# Patient Record
Sex: MALE | Race: WHITE | NOT HISPANIC OR LATINO | URBAN - METROPOLITAN AREA
[De-identification: names, ages, dates, MRNs, and addresses within clinical notes are randomized per-mention and may not be internally consistent; named-entity substitution may affect disease eponyms.]

---

## 2021-01-25 ENCOUNTER — EMERGENCY (EMERGENCY)
Facility: HOSPITAL | Age: 31
LOS: 0 days | Discharge: HOME | End: 2021-01-25
Attending: EMERGENCY MEDICINE | Admitting: EMERGENCY MEDICINE
Payer: MEDICAID

## 2021-01-25 VITALS
HEART RATE: 66 BPM | SYSTOLIC BLOOD PRESSURE: 125 MMHG | TEMPERATURE: 98 F | OXYGEN SATURATION: 98 % | DIASTOLIC BLOOD PRESSURE: 77 MMHG | RESPIRATION RATE: 19 BRPM | WEIGHT: 190.04 LBS

## 2021-01-25 VITALS
SYSTOLIC BLOOD PRESSURE: 132 MMHG | RESPIRATION RATE: 18 BRPM | OXYGEN SATURATION: 98 % | DIASTOLIC BLOOD PRESSURE: 71 MMHG | HEART RATE: 67 BPM | TEMPERATURE: 98 F

## 2021-01-25 DIAGNOSIS — K92.1 MELENA: ICD-10-CM

## 2021-01-25 DIAGNOSIS — K62.89 OTHER SPECIFIED DISEASES OF ANUS AND RECTUM: ICD-10-CM

## 2021-01-25 LAB
ALBUMIN SERPL ELPH-MCNC: 4.4 G/DL — SIGNIFICANT CHANGE UP (ref 3.5–5.2)
ALP SERPL-CCNC: 68 U/L — SIGNIFICANT CHANGE UP (ref 30–115)
ALT FLD-CCNC: 23 U/L — SIGNIFICANT CHANGE UP (ref 0–41)
ANION GAP SERPL CALC-SCNC: 8 MMOL/L — SIGNIFICANT CHANGE UP (ref 7–14)
AST SERPL-CCNC: 21 U/L — SIGNIFICANT CHANGE UP (ref 0–41)
BASOPHILS # BLD AUTO: 0.03 K/UL — SIGNIFICANT CHANGE UP (ref 0–0.2)
BASOPHILS NFR BLD AUTO: 0.6 % — SIGNIFICANT CHANGE UP (ref 0–1)
BILIRUB SERPL-MCNC: 0.4 MG/DL — SIGNIFICANT CHANGE UP (ref 0.2–1.2)
BUN SERPL-MCNC: 11 MG/DL — SIGNIFICANT CHANGE UP (ref 10–20)
CALCIUM SERPL-MCNC: 9.3 MG/DL — SIGNIFICANT CHANGE UP (ref 8.5–10.1)
CHLORIDE SERPL-SCNC: 101 MMOL/L — SIGNIFICANT CHANGE UP (ref 98–110)
CO2 SERPL-SCNC: 30 MMOL/L — SIGNIFICANT CHANGE UP (ref 17–32)
CREAT SERPL-MCNC: 1.1 MG/DL — SIGNIFICANT CHANGE UP (ref 0.7–1.5)
EOSINOPHIL # BLD AUTO: 0.02 K/UL — SIGNIFICANT CHANGE UP (ref 0–0.7)
EOSINOPHIL NFR BLD AUTO: 0.4 % — SIGNIFICANT CHANGE UP (ref 0–8)
GLUCOSE SERPL-MCNC: 97 MG/DL — SIGNIFICANT CHANGE UP (ref 70–99)
HCT VFR BLD CALC: 43.8 % — SIGNIFICANT CHANGE UP (ref 42–52)
HGB BLD-MCNC: 14.6 G/DL — SIGNIFICANT CHANGE UP (ref 14–18)
IMM GRANULOCYTES NFR BLD AUTO: 0.2 % — SIGNIFICANT CHANGE UP (ref 0.1–0.3)
LIDOCAIN IGE QN: 47 U/L — SIGNIFICANT CHANGE UP (ref 7–60)
LYMPHOCYTES # BLD AUTO: 1.14 K/UL — LOW (ref 1.2–3.4)
LYMPHOCYTES # BLD AUTO: 22.6 % — SIGNIFICANT CHANGE UP (ref 20.5–51.1)
MCHC RBC-ENTMCNC: 27.4 PG — SIGNIFICANT CHANGE UP (ref 27–31)
MCHC RBC-ENTMCNC: 33.3 G/DL — SIGNIFICANT CHANGE UP (ref 32–37)
MCV RBC AUTO: 82.2 FL — SIGNIFICANT CHANGE UP (ref 80–94)
MONOCYTES # BLD AUTO: 0.42 K/UL — SIGNIFICANT CHANGE UP (ref 0.1–0.6)
MONOCYTES NFR BLD AUTO: 8.3 % — SIGNIFICANT CHANGE UP (ref 1.7–9.3)
NEUTROPHILS # BLD AUTO: 3.42 K/UL — SIGNIFICANT CHANGE UP (ref 1.4–6.5)
NEUTROPHILS NFR BLD AUTO: 67.9 % — SIGNIFICANT CHANGE UP (ref 42.2–75.2)
NRBC # BLD: 0 /100 WBCS — SIGNIFICANT CHANGE UP (ref 0–0)
PLATELET # BLD AUTO: 225 K/UL — SIGNIFICANT CHANGE UP (ref 130–400)
POTASSIUM SERPL-MCNC: 4.2 MMOL/L — SIGNIFICANT CHANGE UP (ref 3.5–5)
POTASSIUM SERPL-SCNC: 4.2 MMOL/L — SIGNIFICANT CHANGE UP (ref 3.5–5)
PROT SERPL-MCNC: 7 G/DL — SIGNIFICANT CHANGE UP (ref 6–8)
RBC # BLD: 5.33 M/UL — SIGNIFICANT CHANGE UP (ref 4.7–6.1)
RBC # FLD: 12.1 % — SIGNIFICANT CHANGE UP (ref 11.5–14.5)
SODIUM SERPL-SCNC: 139 MMOL/L — SIGNIFICANT CHANGE UP (ref 135–146)
WBC # BLD: 5.04 K/UL — SIGNIFICANT CHANGE UP (ref 4.8–10.8)
WBC # FLD AUTO: 5.04 K/UL — SIGNIFICANT CHANGE UP (ref 4.8–10.8)

## 2021-01-25 PROCEDURE — 99285 EMERGENCY DEPT VISIT HI MDM: CPT

## 2021-01-25 PROCEDURE — 74177 CT ABD & PELVIS W/CONTRAST: CPT | Mod: 26

## 2021-01-25 RX ORDER — IOHEXOL 300 MG/ML
30 INJECTION, SOLUTION INTRAVENOUS ONCE
Refills: 0 | Status: COMPLETED | OUTPATIENT
Start: 2021-01-25 | End: 2021-01-25

## 2021-01-25 RX ORDER — SODIUM CHLORIDE 9 MG/ML
1000 INJECTION INTRAMUSCULAR; INTRAVENOUS; SUBCUTANEOUS ONCE
Refills: 0 | Status: COMPLETED | OUTPATIENT
Start: 2021-01-25 | End: 2021-01-25

## 2021-01-25 RX ADMIN — SODIUM CHLORIDE 1000 MILLILITER(S): 9 INJECTION INTRAMUSCULAR; INTRAVENOUS; SUBCUTANEOUS at 13:48

## 2021-01-25 RX ADMIN — IOHEXOL 30 MILLILITER(S): 300 INJECTION, SOLUTION INTRAVENOUS at 13:48

## 2021-01-25 NOTE — ED PROVIDER NOTE - PHYSICAL EXAMINATION
--EXAM--  VITAL SIGNS: I have reviewed vs documented at present.  CONSTITUTIONAL: Well-developed; well-nourished; in no acute distress.   SKIN: Warm and dry, no acute rash.   HEAD: Normocephalic; atraumatic.  EYES: PERRL, EOM intact; conjunctiva and sclera clear. No nystagmus.  ENT: No nasal discharge; airway clear.  NECK: Supple; non tender.  CARD: S1, S2, Regular rate and rhythm.   RESP: No wheezes, rales or rhonchi.  ABD: Normal bowel sounds; soft; non-distended; non-tender.  rectal exam brown stool there is red blood noted   EXT: Normal ROM.   NEURO: Alert, oriented, grossly unremarkable. Strength 5/5 in all extremities. Sensation intact throughout.  PSYCH: Cooperative, appropriate.

## 2021-01-25 NOTE — ED ADULT NURSE NOTE - NSIMPLEMENTINTERV_GEN_ALL_ED
Implemented All Universal Safety Interventions:  [place] to call system. Call bell, personal items and telephone within reach. Instruct patient to call for assistance. Room bathroom lighting operational. Non-slip footwear when patient is off stretcher. Physically safe environment: no spills, clutter or unnecessary equipment. Stretcher in lowest position, wheels locked, appropriate side rails in place.

## 2021-01-25 NOTE — ED PROVIDER NOTE - CARE PROVIDER_API CALL
India Arteaga (MD)  Gastroenterology  [address] [place] [address]  Phone: [phone]  Fax: [phone]  Follow Up Time:

## 2021-01-25 NOTE — ED PROVIDER NOTE - PATIENT PORTAL LINK FT
You can access the FollowMyHealth Patient Portal offered by [facility] by registering at the following website: [email or web address]. By joining [facility]’s FollowMyHealth portal, you will also be able to view your health information using other applications (apps) compatible with our system.

## 2021-01-25 NOTE — ED PROVIDER NOTE - CLINICAL SUMMARY MEDICAL DECISION MAKING FREE TEXT BOX
Results reviewed and d/w pt.  H/H good.  Ct findings noted.  In my opinion outpatient work up is appropriate.   Importance og GI follow up for direct visualization stressed.  Pt instructed to return if any worsening symptoms or concerns.  They verbalize understanding.

## 2021-01-25 NOTE — ED PROVIDER NOTE - ATTENDING CONTRIBUTION TO CARE
32 yo M presents with c/o rectal bleeding with BM x 1 year, no abd pain, no weight loss.  no n/v.  appetite is nml. no Fhx.  On exam pt in NAD AAO x 3l seen ambulate with steady gait, abd soft nt nd, + blood on rectal exam, no hemorrhoid,

## 2021-01-25 NOTE — ED PROVIDER NOTE - NS ED ROS FT
Review of Systems:  	•	CONSTITUTIONAL - no fever, no diaphoresis, no chills  	•	SKIN - no rash  	•	HEMATOLOGIC - no bleeding, no bruising  	•	EYES - no eye pain, no blurry vision  	•	ENT - no change in hearing, no sore throat, no ear pain or tinnitus  	•	RESPIRATORY - no shortness of breath, no cough  	•	CARDIAC - no chest pain, no palpitations  	•	GI - no abd pain, no nausea, no vomiting, no diarrhea,  positive constipation bleeding stool   	•	GENITO-URINARY - no discharge, no dysuria; no hematuria, no increased urinary frequency  	•	MUSCULOSKELETAL - no joint paint, no swelling, no redness  	•	NEUROLOGIC - no weakness, no headache, no paresthesias, no LOC  	•	PSYCH - no anxiety, non suicidal, non homicidal, no hallucination, no depression

## 2021-01-25 NOTE — ED PROVIDER NOTE - OBJECTIVE STATEMENT
this is a 30 yo male presents to ed for evaluation of bloody stool. patient states this is going on one year. patient came today to get check .

## 2021-02-03 ENCOUNTER — INPATIENT (INPATIENT)
Facility: HOSPITAL | Age: 31
LOS: 3 days | Discharge: HOME | End: 2021-02-07
Attending: INTERNAL MEDICINE | Admitting: INTERNAL MEDICINE
Payer: MEDICAID

## 2021-02-03 VITALS
HEART RATE: 81 BPM | OXYGEN SATURATION: 99 % | RESPIRATION RATE: 20 BRPM | DIASTOLIC BLOOD PRESSURE: 72 MMHG | SYSTOLIC BLOOD PRESSURE: 118 MMHG | TEMPERATURE: 98 F

## 2021-02-03 DIAGNOSIS — Z29.9 ENCOUNTER FOR PROPHYLACTIC MEASURES, UNSPECIFIED: ICD-10-CM

## 2021-02-03 DIAGNOSIS — K92.2 GASTROINTESTINAL HEMORRHAGE, UNSPECIFIED: ICD-10-CM

## 2021-02-03 DIAGNOSIS — Z87.738 PERSONAL HISTORY OF OTHER SPECIFIED (CORRECTED) CONGENITAL MALFORMATIONS OF DIGESTIVE SYSTEM: Chronic | ICD-10-CM

## 2021-02-03 DIAGNOSIS — K62.3 RECTAL PROLAPSE: ICD-10-CM

## 2021-02-03 DIAGNOSIS — K63.3 ULCER OF INTESTINE: ICD-10-CM

## 2021-02-03 LAB
ALBUMIN SERPL ELPH-MCNC: 4.4 G/DL — SIGNIFICANT CHANGE UP (ref 3.5–5.2)
ALP SERPL-CCNC: 68 U/L — SIGNIFICANT CHANGE UP (ref 30–115)
ALT FLD-CCNC: 22 U/L — SIGNIFICANT CHANGE UP (ref 0–41)
ANION GAP SERPL CALC-SCNC: 9 MMOL/L — SIGNIFICANT CHANGE UP (ref 7–14)
APTT BLD: 32.5 SEC — SIGNIFICANT CHANGE UP (ref 27–39.2)
AST SERPL-CCNC: 20 U/L — SIGNIFICANT CHANGE UP (ref 0–41)
BASOPHILS # BLD AUTO: 0.02 K/UL — SIGNIFICANT CHANGE UP (ref 0–0.2)
BASOPHILS NFR BLD AUTO: 0.5 % — SIGNIFICANT CHANGE UP (ref 0–1)
BILIRUB SERPL-MCNC: 0.3 MG/DL — SIGNIFICANT CHANGE UP (ref 0.2–1.2)
BLD GP AB SCN SERPL QL: SIGNIFICANT CHANGE UP
BUN SERPL-MCNC: 9 MG/DL — LOW (ref 10–20)
CALCIUM SERPL-MCNC: 9 MG/DL — SIGNIFICANT CHANGE UP (ref 8.5–10.1)
CHLORIDE SERPL-SCNC: 102 MMOL/L — SIGNIFICANT CHANGE UP (ref 98–110)
CO2 SERPL-SCNC: 28 MMOL/L — SIGNIFICANT CHANGE UP (ref 17–32)
CREAT SERPL-MCNC: 0.9 MG/DL — SIGNIFICANT CHANGE UP (ref 0.7–1.5)
EOSINOPHIL # BLD AUTO: 0.04 K/UL — SIGNIFICANT CHANGE UP (ref 0–0.7)
EOSINOPHIL NFR BLD AUTO: 1 % — SIGNIFICANT CHANGE UP (ref 0–8)
GLUCOSE SERPL-MCNC: 98 MG/DL — SIGNIFICANT CHANGE UP (ref 70–99)
HCT VFR BLD CALC: 41.8 % — LOW (ref 42–52)
HGB BLD-MCNC: 14.2 G/DL — SIGNIFICANT CHANGE UP (ref 14–18)
IMM GRANULOCYTES NFR BLD AUTO: 0.7 % — HIGH (ref 0.1–0.3)
INR BLD: 1.09 RATIO — SIGNIFICANT CHANGE UP (ref 0.65–1.3)
LYMPHOCYTES # BLD AUTO: 1.08 K/UL — LOW (ref 1.2–3.4)
LYMPHOCYTES # BLD AUTO: 26.3 % — SIGNIFICANT CHANGE UP (ref 20.5–51.1)
MCHC RBC-ENTMCNC: 27.6 PG — SIGNIFICANT CHANGE UP (ref 27–31)
MCHC RBC-ENTMCNC: 34 G/DL — SIGNIFICANT CHANGE UP (ref 32–37)
MCV RBC AUTO: 81.3 FL — SIGNIFICANT CHANGE UP (ref 80–94)
MONOCYTES # BLD AUTO: 0.48 K/UL — SIGNIFICANT CHANGE UP (ref 0.1–0.6)
MONOCYTES NFR BLD AUTO: 11.7 % — HIGH (ref 1.7–9.3)
NEUTROPHILS # BLD AUTO: 2.45 K/UL — SIGNIFICANT CHANGE UP (ref 1.4–6.5)
NEUTROPHILS NFR BLD AUTO: 59.8 % — SIGNIFICANT CHANGE UP (ref 42.2–75.2)
NRBC # BLD: 0 /100 WBCS — SIGNIFICANT CHANGE UP (ref 0–0)
PLATELET # BLD AUTO: 218 K/UL — SIGNIFICANT CHANGE UP (ref 130–400)
POTASSIUM SERPL-MCNC: 4.7 MMOL/L — SIGNIFICANT CHANGE UP (ref 3.5–5)
POTASSIUM SERPL-SCNC: 4.7 MMOL/L — SIGNIFICANT CHANGE UP (ref 3.5–5)
PROT SERPL-MCNC: 6.8 G/DL — SIGNIFICANT CHANGE UP (ref 6–8)
PROTHROM AB SERPL-ACNC: 12.5 SEC — SIGNIFICANT CHANGE UP (ref 9.95–12.87)
RAPID RVP RESULT: SIGNIFICANT CHANGE UP
RBC # BLD: 5.14 M/UL — SIGNIFICANT CHANGE UP (ref 4.7–6.1)
RBC # FLD: 12.2 % — SIGNIFICANT CHANGE UP (ref 11.5–14.5)
SARS-COV-2 RNA SPEC QL NAA+PROBE: SIGNIFICANT CHANGE UP
SODIUM SERPL-SCNC: 139 MMOL/L — SIGNIFICANT CHANGE UP (ref 135–146)
WBC # BLD: 4.1 K/UL — LOW (ref 4.8–10.8)
WBC # FLD AUTO: 4.1 K/UL — LOW (ref 4.8–10.8)

## 2021-02-03 PROCEDURE — 99223 1ST HOSP IP/OBS HIGH 75: CPT

## 2021-02-03 PROCEDURE — 99285 EMERGENCY DEPT VISIT HI MDM: CPT

## 2021-02-03 PROCEDURE — 71046 X-RAY EXAM CHEST 2 VIEWS: CPT | Mod: 26

## 2021-02-03 RX ORDER — ACETAMINOPHEN 500 MG
650 TABLET ORAL EVERY 4 HOURS
Refills: 0 | Status: DISCONTINUED | OUTPATIENT
Start: 2021-02-03 | End: 2021-02-07

## 2021-02-03 RX ORDER — PANTOPRAZOLE SODIUM 20 MG/1
40 TABLET, DELAYED RELEASE ORAL
Refills: 0 | Status: DISCONTINUED | OUTPATIENT
Start: 2021-02-03 | End: 2021-02-07

## 2021-02-03 RX ADMIN — PANTOPRAZOLE SODIUM 40 MILLIGRAM(S): 20 TABLET, DELAYED RELEASE ORAL at 21:05

## 2021-02-03 NOTE — ED ADULT NURSE NOTE - NSSUHOSCREENINGYN_ED_ALL_ED
-- serum creatinine on admission 2.7  -- baseline serum creatinine 1.9-2.1. At baseline after IVF  -- likely prerenal from poor p.o. intake     Yes - the patient is able to be screened

## 2021-02-03 NOTE — H&P ADULT - PROBLEM SELECTOR PLAN 1
GI consulted and plan for colonoscopy on 2/5/21.  start clear tomorrow then npo p midnite tomorrow  dulcolax and go lytely prep  preprocedure--cxr and ekg

## 2021-02-03 NOTE — CONSULT NOTE ADULT - ASSESSMENT
31yMale pmh rectal bleeding and intermittent melenic stools for one year and a half presents s/p reducible rectal prolapse the past 3 days after straining. Patient reports table spoons to a half cup of blood each time.  +rectal bleeding +intermittent melenic stools. Patient reports intermittent rectal pain as well with straining    Problem 1-Wall thickening of the rectum without definite adjacent fatty stranding. Differential includes inflammatory etiology. Correlation with patient's symptoms and direct visualization suggested.  -patient with intermittent melenic stools and daily rectal bleeding  ddx- IBD, malignancy, PUD  Rec  -Regular diet today  -EGD/Colonoscopy Friday as patient ate regular meal today  Colonoscopy Prep  -NPO aftermidnight Thursday into Friday  -AM CBC, AM CMP, AM INR, PT, PTT Friday  -Go-lytely 4L Prior day before Colonoscopy Thursday  -Four tablets Dulcolax two at 2pm and two at 6pm Prior to Colonoscopy Thursday   -Clear Liquids Day, no red dye before Colonoscopy Thursday  -Maintain Hemodynamic Stability   -Monitor CBC  -CMP,Optimize Electrolytes  -PT,PTT,INR  -EKG, Chest-Xray   -Transfuse prn to hgb >8  -Two large bore IV lines  -Monitor Vital Signs  -Monitor Stool For blood, frequency, consistency, melena  -Active Type and Screen   31yMale pmh rectal bleeding and intermittent melenic stools for one year and a half presents s/p reducible rectal prolapse the past 3 days after straining. Patient reports table spoons to a half cup of blood each time.  +rectal bleeding +intermittent melenic stools. Patient reports intermittent rectal pain as well with straining    Problem 1-Wall thickening of the rectum without definite adjacent fatty stranding. Differential includes inflammatory etiology. Correlation with patient's symptoms and direct visualization suggested.  -patient with intermittent melenic stools and daily rectal bleeding  ddx- IBD, malignancy, PUD  Rec  -COVID-19 Swab  -Regular diet today  -EGD/Colonoscopy Friday as patient ate regular meal today  Colonoscopy Prep  -NPO after midnight Thursday into Friday  -AM CBC, AM CMP, AM INR, PT, PTT Friday  -Go-lytely 4L Prior day before Colonoscopy Thursday  -Four tablets Dulcolax two at 2pm and two at 6pm Prior to Colonoscopy Thursday   -Clear Liquids Day, no red dye before Colonoscopy Thursday  -Maintain Hemodynamic Stability   -Monitor CBC  -CMP,Optimize Electrolytes  -PT,PTT,INR  -EKG, Chest-Xray   -Transfuse prn to hgb >8  -Two large bore IV lines  -Monitor Vital Signs  -Monitor Stool For blood, frequency, consistency, melena  -Active Type and Screen    problem 2-Moderately distended urinary bladder, nonspecific.  Rec  - Care as per primary team

## 2021-02-03 NOTE — ED PROVIDER NOTE - OBJECTIVE STATEMENT
Patient c/o rectal bleeding for 1 year, Seen in ED 1 week ago, CT neg, States last night had pain with bleeding with rectal prolapse, No N/V, no fever. Patient has picture on his phone show large rectal prolapse

## 2021-02-03 NOTE — H&P ADULT - NSHPLABSRESULTS_GEN_ALL_CORE
14.2   4.10  )-----------( 218      ( 03 Feb 2021 12:45 )             41.8       02-03    139  |  102  |  9<L>  ----------------------------<  98  4.7   |  28  |  0.9    Ca    9.0      03 Feb 2021 12:45    TPro  6.8  /  Alb  4.4  /  TBili  0.3  /  DBili  x   /  AST  20  /  ALT  22  /  AlkPhos  68  02-03              PT/INR - ( 03 Feb 2021 12:45 )   PT: 12.50 sec;   INR: 1.09 ratio         PTT - ( 03 Feb 2021 12:45 )  PTT:32.5 sec      < from: CT Abdomen and Pelvis w/ Oral Cont and w/ IV Cont (01.25.21 @ 17:04) >    IMPRESSION:  Moderately distended urinary bladder, nonspecific.    Normal caliber appendix.    Wall thickening of the rectum without definite adjacent fatty stranding. Differential includes inflammatory etiology. Correlation with patient's symptoms and direct visualization suggested.

## 2021-02-03 NOTE — ED PROVIDER NOTE - CLINICAL SUMMARY MEDICAL DECISION MAKING FREE TEXT BOX
Pt with continued rectal bleeding, now with pain and prolapse (resolved).  Recently seen in ED.  Work up reviewed and pt found with rectal wall thickening.  Pt had difficulty arranging outpt follow up as well. Labs repeated and thankfully H/H is stable.  GI consulted and wants pt to be admitted for colonoscopy prep with plan to do procedure on 2/5.  Pt is amenable to this plan.

## 2021-02-03 NOTE — H&P ADULT - NSHPPHYSICALEXAM_GEN_ALL_CORE
Vital Signs Last 24 Hrs  T(C): 36.3 (03 Feb 2021 16:39), Max: 37.1 (03 Feb 2021 12:23)  T(F): 97.4 (03 Feb 2021 16:39), Max: 98.7 (03 Feb 2021 12:23)  HR: 56 (03 Feb 2021 16:39) (56 - 81)  BP: 120/70 (03 Feb 2021 16:39) (118/61 - 120/70)  BP(mean): --  RR: 18 (03 Feb 2021 16:39) (18 - 20)  SpO2: 99% (03 Feb 2021 16:39) (98% - 99%)    PHYSICAL EXAM:      Constitutional: A&Ox4  Respiratory: cta b/l  Cardiovascular: s1 s2 rrr  Gastrointestinal: soft nt  nd + bs no rebound or guarding  rectal: external inspection appears normal  Genitourinary: no cva tenderness  Extremities: normal rom, no edema, calf tenderness  Neurological:no focal deficits  Skin: no rash

## 2021-02-03 NOTE — CONSULT NOTE ADULT - SUBJECTIVE AND OBJECTIVE BOX
Chief complaint/Reason for consult: Rectal bleeding and intermittent melenic stools    HPI: 31yMale pmh rectal bleeding and intermittent melenic stools for one year and a half presents s/p reducible rectal prolapse the past 3 days after straining. Patient reports table spoons to a half cup of blood each time. Patient denies nausea, vomiting, hematemesis, diarrhea, constipation, abdominal pain. +rectal bleeding +intermittent melenic stools.      PAST MEDICAL & SURGICAL HISTORY:   No pertinent past medical history    No significant past surgical history          Family history:  FAMILY HISTORY:    No GI cancers in first or second degree relatives    Social History: No smoking. No alcohol. No illegal drug use.    Allergies:   No Known Allergies        REVIEW OF SYSTEMS  General:  No weight loss, fevers, or chills.  Eyes:  No reported pain or visual changes  ENT:  No sore throat or runny nose.  NECK: No stiffness or lymphadenopathy  CV:  No chest pain or palpitations.  Resp:  No shortness of breath, cough, wheezing or hemoptysis  GI:  No abdominal pain, nausea, vomiting, dysphagia, diarrhea or constipation. No rectal bleeding, melena, or hematemesis.  Muscle:  No aches or weakness  Neuro:  No tingling, numbness       VITALS:   T(F): 101 (02-03-21 @ 12:45), Max: 101 (02-03-21 @ 12:45)  HR: 66 (02-03-21 @ 12:23) (66 - 81)  BP: 118/61 (02-03-21 @ 12:23) (118/61 - 118/72)  RR: 18 (02-03-21 @ 12:23) (18 - 20)  SpO2: 98% (02-03-21 @ 12:23) (98% - 99%)    PHYSICAL EXAM:  GENERAL: AAOx3, no acute distress.  HEAD:  Atraumatic, Normocephalic  EYES: conjunctiva and sclera clear  NECK: Supple, No thyromegaly   CHEST/LUNG: Clear to auscultation bilaterally; No wheeze, rhonchi, or rales  HEART: Regular rate and rhythm; normal S1, S2, No murmurs.  ABDOMEN: Soft, nontender, nondistended; Bowel sounds present, no abdominal bruit, masses, or hepatosplenomegaly  EXTREMITIES:  2+ Peripheral Pulses. No clubbing, cyanosis, or edema, warm   NEUROLOGY: No asterixis or tremor  SKIN: Intact, no jaundice          LABS:  02-03    139  |  102  |  9<L>  ----------------------------<  98  4.7   |  28  |  0.9    Ca    9.0      03 Feb 2021 12:45    TPro  6.8  /  Alb  4.4  /  TBili  0.3  /  DBili  x   /  AST  20  /  ALT  22  /  AlkPhos  68  02-03                          14.2   4.10  )-----------( 218      ( 03 Feb 2021 12:45 )             41.8     LIVER FUNCTIONS - ( 03 Feb 2021 12:45 )  Alb: 4.4 g/dL / Pro: 6.8 g/dL / ALK PHOS: 68 U/L / ALT: 22 U/L / AST: 20 U/L / GGT: x           PT/INR - ( 03 Feb 2021 12:45 )   PT: 12.50 sec;   INR: 1.09 ratio         PTT - ( 03 Feb 2021 12:45 )  PTT:32.5 sec    IMAGING:    < from: CT Abdomen and Pelvis w/ Oral Cont and w/ IV Cont (01.25.21 @ 17:04) >  EXAM:  CT ABDOMEN AND PELVIS OC IC            PROCEDURE DATE:  01/25/2021            INTERPRETATION:  CLINICAL STATEMENT: Abdominal pain.    TECHNIQUE: Contiguous axial CT images were obtained from the lower chest to the pubic symphysis following administration of 95 cc Optiray 320 intravenous contrast.  5 cc of contrast were discarded. Oral contrast was administered. Reformatted images in the coronal and sagittal planes were acquired.    COMPARISON CT: None    FINDINGS:    LOWER CHEST: Bibasilar subsegmental atelectasis.    HEPATOBILIARY: Unremarkable.    SPLEEN: Unremarkable.    PANCREAS: Unremarkable.    ADRENAL GLANDS: Unremarkable.    KIDNEYS: Symmetric renal enhancement. No hydronephrosis.    ABDOMINOPELVIC NODES: No enlarged abdominal or pelvic lymph nodes.    PELVIC ORGANS: Distended urinary bladder    PERITONEUM/MESENTERY/BOWEL: No bowel obstruction, ascites or free intraperitoneal air. The cecum and appendix are located within the right mid upper quadrant. The appendix is normal caliber (601/25-45). There is wall thickening of the rectum.    BONES/SOFT TISSUES: No acute osseous abnormality.      IMPRESSION:  Moderately distended urinary bladder, nonspecific.    Normal caliber appendix.    Wall thickening of the rectum without definite adjacent fatty stranding. Differential includes inflammatory etiology. Correlation with patient's symptoms and direct visualization suggested.              MEGHAN OLIVEIRA MD; Attending Radiologist  This document has been electronically signed. Jan 25 2021  6:51PM    < end of copied text >       Chief complaint/Reason for consult: Rectal bleeding and intermittent melenic stools    HPI: 31yMale pmh rectal bleeding and intermittent melenic stools for one year and a half presents s/p reducible rectal prolapse the past 3 days after straining. Patient reports table spoons to a half cup of blood each time. Patient denies nausea, vomiting, hematemesis, diarrhea, constipation, abdominal pain. +rectal bleeding +intermittent melenic stools.      PAST MEDICAL & SURGICAL HISTORY:   No pertinent past medical history    No significant past surgical history          Family history:  FAMILY HISTORY:    No GI cancers in first or second degree relatives    Social History: +vaping smoking. No alcohol. No illegal drug use.    Allergies:   No Known Allergies        REVIEW OF SYSTEMS  General:  No weight loss, fevers, or chills.  Eyes:  No reported pain or visual changes  ENT:  No sore throat or runny nose.  NECK: No stiffness or lymphadenopathy  CV:  No chest pain or palpitations.  Resp:  No shortness of breath, cough, wheezing or hemoptysis  GI:  No abdominal pain, nausea, vomiting, dysphagia, diarrhea or constipation. No rectal bleeding, melena, or hematemesis.  Muscle:  No aches or weakness  Neuro:  No tingling, numbness       VITALS:   T(F): 101 (02-03-21 @ 12:45), Max: 101 (02-03-21 @ 12:45)  HR: 66 (02-03-21 @ 12:23) (66 - 81)  BP: 118/61 (02-03-21 @ 12:23) (118/61 - 118/72)  RR: 18 (02-03-21 @ 12:23) (18 - 20)  SpO2: 98% (02-03-21 @ 12:23) (98% - 99%)    PHYSICAL EXAM:  GENERAL: AAOx3, no acute distress.  HEAD:  Atraumatic, Normocephalic  EYES: conjunctiva and sclera clear  NECK: Supple, No thyromegaly   CHEST/LUNG: Clear to auscultation bilaterally; No wheeze, rhonchi, or rales  HEART: Regular rate and rhythm; normal S1, S2, No murmurs.  ABDOMEN: Soft, nontender, nondistended; Bowel sounds present, no abdominal bruit, masses, or hepatosplenomegaly  EXTREMITIES:  2+ Peripheral Pulses. No clubbing, cyanosis, or edema, warm   NEUROLOGY: No asterixis or tremor  SKIN: Intact, no jaundice          LABS:  02-03    139  |  102  |  9<L>  ----------------------------<  98  4.7   |  28  |  0.9    Ca    9.0      03 Feb 2021 12:45    TPro  6.8  /  Alb  4.4  /  TBili  0.3  /  DBili  x   /  AST  20  /  ALT  22  /  AlkPhos  68  02-03                          14.2   4.10  )-----------( 218      ( 03 Feb 2021 12:45 )             41.8     LIVER FUNCTIONS - ( 03 Feb 2021 12:45 )  Alb: 4.4 g/dL / Pro: 6.8 g/dL / ALK PHOS: 68 U/L / ALT: 22 U/L / AST: 20 U/L / GGT: x           PT/INR - ( 03 Feb 2021 12:45 )   PT: 12.50 sec;   INR: 1.09 ratio         PTT - ( 03 Feb 2021 12:45 )  PTT:32.5 sec    IMAGING:    < from: CT Abdomen and Pelvis w/ Oral Cont and w/ IV Cont (01.25.21 @ 17:04) >  EXAM:  CT ABDOMEN AND PELVIS OC IC            PROCEDURE DATE:  01/25/2021            INTERPRETATION:  CLINICAL STATEMENT: Abdominal pain.    TECHNIQUE: Contiguous axial CT images were obtained from the lower chest to the pubic symphysis following administration of 95 cc Optiray 320 intravenous contrast.  5 cc of contrast were discarded. Oral contrast was administered. Reformatted images in the coronal and sagittal planes were acquired.    COMPARISON CT: None    FINDINGS:    LOWER CHEST: Bibasilar subsegmental atelectasis.    HEPATOBILIARY: Unremarkable.    SPLEEN: Unremarkable.    PANCREAS: Unremarkable.    ADRENAL GLANDS: Unremarkable.    KIDNEYS: Symmetric renal enhancement. No hydronephrosis.    ABDOMINOPELVIC NODES: No enlarged abdominal or pelvic lymph nodes.    PELVIC ORGANS: Distended urinary bladder    PERITONEUM/MESENTERY/BOWEL: No bowel obstruction, ascites or free intraperitoneal air. The cecum and appendix are located within the right mid upper quadrant. The appendix is normal caliber (601/25-45). There is wall thickening of the rectum.    BONES/SOFT TISSUES: No acute osseous abnormality.      IMPRESSION:  Moderately distended urinary bladder, nonspecific.    Normal caliber appendix.    Wall thickening of the rectum without definite adjacent fatty stranding. Differential includes inflammatory etiology. Correlation with patient's symptoms and direct visualization suggested.              MEGHAN OLIVEIRA MD; Attending Radiologist  This document has been electronically signed. Jan 25 2021  6:51PM    < end of copied text >

## 2021-02-03 NOTE — H&P ADULT - ATTENDING COMMENTS
Patient seen and examined at bedside independently of PA and agree with the H/P unless otherwise stated    1) Rectal Prolapse/Lower GI bleed  -stable hb  -GI consult   -EGD and Colonoscopy on Friday   -denies hx of Inflammatory bowel disease     gi ppx       # Progress Note Handoff  PENDING as follows  consults: GI   Test: CBC in am   Family discussion: discussed with patient who comprehends his medical care   Disposition: home once cleared by GI     Attending Physician Dr. Liliana Mckeon # [id number]

## 2021-02-03 NOTE — ED PROVIDER NOTE - PROGRESS NOTE DETAILS
GI consult called, labs ordered will see patient in ED GI wants patient admitted, Will prep patient tomorrow for endoscopy/colonoscopy to be done Friday

## 2021-02-03 NOTE — H&P ADULT - HISTORY OF PRESENT ILLNESS
Patient is a 30yo M presenting to ED today with c/o rectal bleeding for 1-2 years, described as intermittent, mild, dark red blood on outside of stool and mixed in, associated with chronic constipation, and feeling of a mass at rectum while having a BM, and so he took a picture yesterday and was concerned about appearance of rectum after BM so came to ED today.  Patient had some mild abd and back pain yesterday. Patient denies any fever, chills, nausea, or vomiting.

## 2021-02-03 NOTE — ED PROVIDER NOTE - ATTENDING CONTRIBUTION TO CARE
32 yo M presents with c/o rectal bleeding on and off x 1 1/2 yrs.  Seen in ED last week for same .  Had labs and CT scan.  CT scan revealed thickened rectal wall.  Pt states last night while trying to have BM he had rectal pain and had bulging from his rectum which has now resolved.  (pt has picture on his phone of rectal prolapse). no n/v, no fevers.  On exam pt in NAD AAO x 3, MMM and pink, Abd soft nt nd, no hemorrhoid, no prolapse

## 2021-02-03 NOTE — H&P ADULT - ASSESSMENT
Patient is a 30yo M presenting to ED today with c/o rectal bleeding for 1-2 years, described as intermittent, mild, dark red blood on outside of stool and mixed in, associated with chronic constipation, and feeling of a mass at rectum while having a BM, and so he took a picture yesterday and was concerned about appearance of rectum after BM so came to ED today.  Patient had some mild abd and back pain yesterday. Patient denies any fever, chills, nausea, or vomiting.  Appearance of anus is normal currently on Physical exam.  Picture showed to me by patient appears as a rectal prolpase.  GI consulted and plan for colonoscopy on 2/5/21.

## 2021-02-04 LAB
ALBUMIN SERPL ELPH-MCNC: 4 G/DL — SIGNIFICANT CHANGE UP (ref 3.5–5.2)
ALP SERPL-CCNC: 64 U/L — SIGNIFICANT CHANGE UP (ref 30–115)
ALT FLD-CCNC: 19 U/L — SIGNIFICANT CHANGE UP (ref 0–41)
ANION GAP SERPL CALC-SCNC: 9 MMOL/L — SIGNIFICANT CHANGE UP (ref 7–14)
APTT BLD: 32.8 SEC — SIGNIFICANT CHANGE UP (ref 27–39.2)
AST SERPL-CCNC: 17 U/L — SIGNIFICANT CHANGE UP (ref 0–41)
BASOPHILS # BLD AUTO: 0.02 K/UL — SIGNIFICANT CHANGE UP (ref 0–0.2)
BASOPHILS NFR BLD AUTO: 0.3 % — SIGNIFICANT CHANGE UP (ref 0–1)
BILIRUB SERPL-MCNC: 0.2 MG/DL — SIGNIFICANT CHANGE UP (ref 0.2–1.2)
BUN SERPL-MCNC: 14 MG/DL — SIGNIFICANT CHANGE UP (ref 10–20)
CALCIUM SERPL-MCNC: 9 MG/DL — SIGNIFICANT CHANGE UP (ref 8.5–10.1)
CHLORIDE SERPL-SCNC: 103 MMOL/L — SIGNIFICANT CHANGE UP (ref 98–110)
CO2 SERPL-SCNC: 29 MMOL/L — SIGNIFICANT CHANGE UP (ref 17–32)
CREAT SERPL-MCNC: 0.9 MG/DL — SIGNIFICANT CHANGE UP (ref 0.7–1.5)
EOSINOPHIL # BLD AUTO: 0.06 K/UL — SIGNIFICANT CHANGE UP (ref 0–0.7)
EOSINOPHIL NFR BLD AUTO: 0.9 % — SIGNIFICANT CHANGE UP (ref 0–8)
GLUCOSE SERPL-MCNC: 85 MG/DL — SIGNIFICANT CHANGE UP (ref 70–99)
HCT VFR BLD CALC: 42.1 % — SIGNIFICANT CHANGE UP (ref 42–52)
HGB BLD-MCNC: 14.1 G/DL — SIGNIFICANT CHANGE UP (ref 14–18)
IMM GRANULOCYTES NFR BLD AUTO: 0.5 % — HIGH (ref 0.1–0.3)
INR BLD: 1.03 RATIO — SIGNIFICANT CHANGE UP (ref 0.65–1.3)
LYMPHOCYTES # BLD AUTO: 1.9 K/UL — SIGNIFICANT CHANGE UP (ref 1.2–3.4)
LYMPHOCYTES # BLD AUTO: 28.9 % — SIGNIFICANT CHANGE UP (ref 20.5–51.1)
MAGNESIUM SERPL-MCNC: 2.2 MG/DL — SIGNIFICANT CHANGE UP (ref 1.8–2.4)
MCHC RBC-ENTMCNC: 28 PG — SIGNIFICANT CHANGE UP (ref 27–31)
MCHC RBC-ENTMCNC: 33.5 G/DL — SIGNIFICANT CHANGE UP (ref 32–37)
MCV RBC AUTO: 83.5 FL — SIGNIFICANT CHANGE UP (ref 80–94)
MONOCYTES # BLD AUTO: 0.75 K/UL — HIGH (ref 0.1–0.6)
MONOCYTES NFR BLD AUTO: 11.4 % — HIGH (ref 1.7–9.3)
NEUTROPHILS # BLD AUTO: 3.81 K/UL — SIGNIFICANT CHANGE UP (ref 1.4–6.5)
NEUTROPHILS NFR BLD AUTO: 58 % — SIGNIFICANT CHANGE UP (ref 42.2–75.2)
NRBC # BLD: 0 /100 WBCS — SIGNIFICANT CHANGE UP (ref 0–0)
PHOSPHATE SERPL-MCNC: 5.3 MG/DL — HIGH (ref 2.1–4.9)
PLATELET # BLD AUTO: 218 K/UL — SIGNIFICANT CHANGE UP (ref 130–400)
POTASSIUM SERPL-MCNC: 4.6 MMOL/L — SIGNIFICANT CHANGE UP (ref 3.5–5)
POTASSIUM SERPL-SCNC: 4.6 MMOL/L — SIGNIFICANT CHANGE UP (ref 3.5–5)
PROT SERPL-MCNC: 5.9 G/DL — LOW (ref 6–8)
PROTHROM AB SERPL-ACNC: 11.8 SEC — SIGNIFICANT CHANGE UP (ref 9.95–12.87)
RBC # BLD: 5.04 M/UL — SIGNIFICANT CHANGE UP (ref 4.7–6.1)
RBC # FLD: 12.2 % — SIGNIFICANT CHANGE UP (ref 11.5–14.5)
SARS-COV-2 IGG SERPL QL IA: POSITIVE
SARS-COV-2 IGM SERPL IA-ACNC: 1.47 INDEX — HIGH
SODIUM SERPL-SCNC: 141 MMOL/L — SIGNIFICANT CHANGE UP (ref 135–146)
WBC # BLD: 6.57 K/UL — SIGNIFICANT CHANGE UP (ref 4.8–10.8)
WBC # FLD AUTO: 6.57 K/UL — SIGNIFICANT CHANGE UP (ref 4.8–10.8)

## 2021-02-04 PROCEDURE — 99233 SBSQ HOSP IP/OBS HIGH 50: CPT

## 2021-02-04 PROCEDURE — 99232 SBSQ HOSP IP/OBS MODERATE 35: CPT

## 2021-02-04 RX ORDER — SOD SULF/SODIUM/NAHCO3/KCL/PEG
4000 SOLUTION, RECONSTITUTED, ORAL ORAL ONCE
Refills: 0 | Status: COMPLETED | OUTPATIENT
Start: 2021-02-04 | End: 2021-02-04

## 2021-02-04 RX ORDER — SOD SULF/SODIUM/NAHCO3/KCL/PEG
4000 SOLUTION, RECONSTITUTED, ORAL ORAL ONCE
Refills: 0 | Status: DISCONTINUED | OUTPATIENT
Start: 2021-02-04 | End: 2021-02-04

## 2021-02-04 RX ADMIN — PANTOPRAZOLE SODIUM 40 MILLIGRAM(S): 20 TABLET, DELAYED RELEASE ORAL at 06:26

## 2021-02-04 RX ADMIN — Medication 4000 MILLILITER(S): at 15:16

## 2021-02-04 RX ADMIN — Medication 10 MILLIGRAM(S): at 18:36

## 2021-02-04 RX ADMIN — Medication 10 MILLIGRAM(S): at 15:16

## 2021-02-04 NOTE — PROGRESS NOTE ADULT - ASSESSMENT
31yMale pmh rectal bleeding and intermittent melenic stools for one year and a half presents s/p reducible rectal prolapse the past 3 days after straining. Patient reports table spoons to a half cup of blood each time.  +rectal bleeding +intermittent melenic stools. Patient reports intermittent rectal pain as well with straining    Problem 1-Wall thickening of the rectum without definite adjacent fatty stranding. Differential includes inflammatory etiology. Correlation with patient's symptoms and direct visualization suggested.  -patient with intermittent melenic stools and daily rectal bleeding  ddx- IBD, malignancy, PUD  Rec  -COVID-19 Swab  -Regular diet today  -EGD/Colonoscopy tomorrow, case booked  Colonoscopy Prep  -NPO after midnight Thursday into Friday  -AM CBC, AM CMP, AM INR, PT, PTT Friday  -Go-lytely 4L Prior day before Colonoscopy Thursday  -Four tablets Dulcolax two at 2pm and two at 6pm Prior to Colonoscopy Thursday   -Clear Liquids Day, no red dye before Colonoscopy Thursday  -Maintain Hemodynamic Stability   -Monitor CBC  -CMP,Optimize Electrolytes  -Transfuse prn to hgb >8  -Two large bore IV lines  -Monitor Vital Signs  -Monitor Stool For blood, frequency, consistency, melena  -Active Type and Screen    problem 2-Moderately distended urinary bladder, nonspecific.  Rec  - Care as per primary team

## 2021-02-04 NOTE — CHART NOTE - NSCHARTNOTEFT_GEN_A_CORE
Pt presented with rectal bleeding.  GI consult appreciated. Pt is scheduled for EGD/Colonoscopy tomorrow.  - clears today  -NPO after midnight  -AM CBC, CMP, INR, PT, PTT   -Go-lytely 4L today  -Four tablets Dulcolax two at 2pm and two at 6pm     Case d/w Dr. cMkeon

## 2021-02-04 NOTE — PROGRESS NOTE ADULT - ASSESSMENT
Patient is a 30yo M presenting to ED today with c/o rectal bleeding for 1-2 years, described as intermittent, mild, dark red blood on outside of stool and mixed in, associated with chronic constipation, and feeling of a mass at rectum while having a BM, and so he took a picture yesterday and was concerned about appearance of rectum after BM so came to ED today.  Patient had some mild abd and back pain yesterday. Patient denies any fever, chills, nausea, or vomiting.      1) Rectal Prolapse/Lower GI bleed  -monitor CBC  -GI consult   -EGD and Colonoscopy on Friday   -denies hx of Inflammatory bowel disease     gi ppx     Attending Physician Dr. Liliana Mckeon # [id number]

## 2021-02-04 NOTE — PROGRESS NOTE ADULT - ATTENDING COMMENTS
I personally interviewed and examined the patient. Patient with rectal bleeding EGD/Colonoscopy tomorrow.

## 2021-02-05 ENCOUNTER — RESULT REVIEW (OUTPATIENT)
Age: 31
End: 2021-02-05

## 2021-02-05 ENCOUNTER — TRANSCRIPTION ENCOUNTER (OUTPATIENT)
Age: 31
End: 2021-02-05

## 2021-02-05 LAB
ALBUMIN SERPL ELPH-MCNC: 3.9 G/DL — SIGNIFICANT CHANGE UP (ref 3.5–5.2)
ALP SERPL-CCNC: 60 U/L — SIGNIFICANT CHANGE UP (ref 30–115)
ALT FLD-CCNC: 17 U/L — SIGNIFICANT CHANGE UP (ref 0–41)
ANION GAP SERPL CALC-SCNC: 9 MMOL/L — SIGNIFICANT CHANGE UP (ref 7–14)
APTT BLD: 30.6 SEC — SIGNIFICANT CHANGE UP (ref 27–39.2)
AST SERPL-CCNC: 15 U/L — SIGNIFICANT CHANGE UP (ref 0–41)
BILIRUB SERPL-MCNC: 0.4 MG/DL — SIGNIFICANT CHANGE UP (ref 0.2–1.2)
BUN SERPL-MCNC: 7 MG/DL — LOW (ref 10–20)
CALCIUM SERPL-MCNC: 8.7 MG/DL — SIGNIFICANT CHANGE UP (ref 8.5–10.1)
CHLORIDE SERPL-SCNC: 101 MMOL/L — SIGNIFICANT CHANGE UP (ref 98–110)
CO2 SERPL-SCNC: 31 MMOL/L — SIGNIFICANT CHANGE UP (ref 17–32)
CREAT SERPL-MCNC: 1.1 MG/DL — SIGNIFICANT CHANGE UP (ref 0.7–1.5)
GLUCOSE SERPL-MCNC: 90 MG/DL — SIGNIFICANT CHANGE UP (ref 70–99)
HCT VFR BLD CALC: 41.2 % — LOW (ref 42–52)
HGB BLD-MCNC: 13.7 G/DL — LOW (ref 14–18)
INR BLD: 1.18 RATIO — SIGNIFICANT CHANGE UP (ref 0.65–1.3)
MCHC RBC-ENTMCNC: 27.7 PG — SIGNIFICANT CHANGE UP (ref 27–31)
MCHC RBC-ENTMCNC: 33.3 G/DL — SIGNIFICANT CHANGE UP (ref 32–37)
MCV RBC AUTO: 83.4 FL — SIGNIFICANT CHANGE UP (ref 80–94)
NRBC # BLD: 0 /100 WBCS — SIGNIFICANT CHANGE UP (ref 0–0)
PLATELET # BLD AUTO: 219 K/UL — SIGNIFICANT CHANGE UP (ref 130–400)
POTASSIUM SERPL-MCNC: 4 MMOL/L — SIGNIFICANT CHANGE UP (ref 3.5–5)
POTASSIUM SERPL-SCNC: 4 MMOL/L — SIGNIFICANT CHANGE UP (ref 3.5–5)
PROT SERPL-MCNC: 5.8 G/DL — LOW (ref 6–8)
PROTHROM AB SERPL-ACNC: 13.6 SEC — HIGH (ref 9.95–12.87)
RBC # BLD: 4.94 M/UL — SIGNIFICANT CHANGE UP (ref 4.7–6.1)
RBC # FLD: 12.2 % — SIGNIFICANT CHANGE UP (ref 11.5–14.5)
SODIUM SERPL-SCNC: 141 MMOL/L — SIGNIFICANT CHANGE UP (ref 135–146)
WBC # BLD: 5.08 K/UL — SIGNIFICANT CHANGE UP (ref 4.8–10.8)
WBC # FLD AUTO: 5.08 K/UL — SIGNIFICANT CHANGE UP (ref 4.8–10.8)

## 2021-02-05 PROCEDURE — 88313 SPECIAL STAINS GROUP 2: CPT | Mod: 26

## 2021-02-05 PROCEDURE — 88305 TISSUE EXAM BY PATHOLOGIST: CPT | Mod: 26

## 2021-02-05 PROCEDURE — 71260 CT THORAX DX C+: CPT | Mod: 26

## 2021-02-05 PROCEDURE — 88312 SPECIAL STAINS GROUP 1: CPT | Mod: 26

## 2021-02-05 PROCEDURE — 43239 EGD BIOPSY SINGLE/MULTIPLE: CPT | Mod: XS

## 2021-02-05 PROCEDURE — 45380 COLONOSCOPY AND BIOPSY: CPT

## 2021-02-05 PROCEDURE — 88342 IMHCHEM/IMCYTCHM 1ST ANTB: CPT | Mod: 26

## 2021-02-05 PROCEDURE — 99233 SBSQ HOSP IP/OBS HIGH 50: CPT

## 2021-02-05 RX ORDER — SODIUM CHLORIDE 9 MG/ML
1000 INJECTION INTRAMUSCULAR; INTRAVENOUS; SUBCUTANEOUS
Refills: 0 | Status: DISCONTINUED | OUTPATIENT
Start: 2021-02-05 | End: 2021-02-07

## 2021-02-05 RX ADMIN — SODIUM CHLORIDE 75 MILLILITER(S): 9 INJECTION INTRAMUSCULAR; INTRAVENOUS; SUBCUTANEOUS at 18:54

## 2021-02-05 NOTE — PRE-OP CHECKLIST - AS BP NONINV METHOD
HPI:    Crystal Ferrara is a 48 year old female who presents to clinic today for follow up on anxiety and blood pressure. She was in to see me 2018 for elevated blood pressure. She also was having increased anxiety. Started on lisinopril and paxil. Had some s/e to lisinopril so this was was stopped. Reprots having lips feeling funny and numb. She did not her blood pressure did go down with lisinopril. Her blood pressure results are 155/95 at home. Feels paxil is improving her anxiety sx.      Past Medical History:   Diagnosis Date     Excessive and frequent menstruation with regular cycle     2013     Personal history of other medical treatment (CODE)     .     Personal history of other medical treatment (CODE)     No Comments Provided       Past Surgical History:   Procedure Laterality Date     APPENDECTOMY OPEN      No Comments Provided     TONSILLECTOMY      No Comments Provided       Family History   Problem Relation Age of Onset     Family History Negative Mother      Good Health     Hypertension Mother      Hypertension     Family History Negative Father      Good Health     Family History Negative Sister      Good Health     Family History Negative Son      Good Health     Family History Negative Daughter      Good Health     Breast Cancer Maternal Aunt      Cancer-breast     Breast Cancer Paternal Aunt      Cancer-breast       Social History     Social History     Marital status:      Spouse name: N/A     Number of children: N/A     Years of education: N/A     Occupational History     Not on file.     Social History Main Topics     Smoking status: Current Some Day Smoker     Packs/day: 0.25     Years: 8.00     Types: Cigarettes     Last attempt to quit: 2016     Smokeless tobacco: Never Used     Alcohol use Yes      Comment: Alcoholic Drinks/day: once weekly     Drug use: Not on file      Comment: Drug use: No     Sexual activity: Yes     Partners: Male     Birth control/  protection: Pill     Other Topics Concern     Not on file     Social History Narrative    ** Merged History Encounter **         ** Data from: 4/1/13 Enc Dept: [facility]         ** Data from: 6/24/13 Enc Dept: [facility]    p 6/10/2013.       Current Outpatient Prescriptions   Medication Sig Dispense Refill     PARoxetine (PAXIL) 10 MG tablet Take 10 mg by mouth daily       PARoxetine (PAXIL) 20 MG tablet Take 1 tablet (20 mg) by mouth At Bedtime 30 tablet 1     chlorthalidone (HYGROTON) 25 MG tablet Take 1 tablet (25 mg) by mouth daily 30 tablet 1       Allergies   Allergen Reactions     Amoxicillin Hives       ROS:  Pertinent positives and negatives are noted in HPI.    EXAM:  General appearance: well appearing female, in no acute distress  Respiratory: clear to auscultation bilaterally  Cardiac: RRR with no murmurs  Psychological: normal affect, alert and pleasant    PHQ Depression Screen  PHQ-9 SCORE 2/9/2018 2/23/2018   Total Score 9 8       ASSESSMENT AND PLAN:    1. Anxiety    2. Hypertension, unspecified type      1. She is responding well to paxil in regards to her anxiety. No s/e noted. Will increased to 20 mg daily, expect her be on this for next 4-6 months. She will f/u in 1 month or sooner if concerns.   2. She did not tolerate lisinopril so this was stopped. Recommend chlorthalidone daily. She will f/u in 1 month for repeat labs and to check her BP. Recommend she continue to monitor her BP at home and call with any concerns. Handout given on blood pressure management as well.     I spent approximately 30 minutes with the patient (exclusive of separately billed services/procedures), with greater than 50% spent in counseling, prognosis, risks and benefits of management or follow-up.  Reviewed importance of compliance with chosen treatment options and follow-up.  Risk factor reduction and patient education and coordinating care, establishing and/or reviewing the patient's medical  electronic record also completed during today's exam .    Pau Rashid..................2/23/2018 10:06 AM

## 2021-02-05 NOTE — PROGRESS NOTE ADULT - ASSESSMENT
Patient is a 32yo M presenting to ED today with c/o rectal bleeding for 1-2 years, described as intermittent, mild, dark red blood on outside of stool and mixed in, associated with chronic constipation, and feeling of a mass at rectum while having a BM, and so he took a picture yesterday and was concerned about appearance of rectum after BM so came to ED today.  Patient had some mild abd and back pain yesterday. Patient denies any fever, chills, nausea, or vomiting.      1) Rectal Prolapse/Lower GI bleed -- r/o Crohn's disease   -monitor CBC  -GI following  -EGD and Colonoscopy today  -denies hx of Inflammatory bowel disease     gi ppx     Attending Physician Dr. Liliana Mckeon # [id number]

## 2021-02-06 PROCEDURE — 99233 SBSQ HOSP IP/OBS HIGH 50: CPT

## 2021-02-06 NOTE — PROGRESS NOTE ADULT - ASSESSMENT
Patient is a 30yo M presenting to ED today with c/o rectal bleeding for 1-2 years, described as intermittent, mild, dark red blood on outside of stool and mixed in, associated with chronic constipation, and feeling of a mass at rectum while having a BM, and so he took a picture yesterday and was concerned about appearance of rectum after BM so came to ED today.  Patient had some mild abd and back pain yesterday. Patient denies any fever, chills, nausea, or vomiting.      1) New Rectal mass /Lower GI bleed  -cbc stable  -CT imaging studies to r/o metastases  -Colorectal surgery consult --- discussed with surgical PA     gi ppx     Attending Physician Dr. Liliana Mckeon # [id number]   -no discharge today   -pt aware of the findings

## 2021-02-06 NOTE — CONSULT NOTE ADULT - SUBJECTIVE AND OBJECTIVE BOX
Patient is a 30yo M admitted with c/o rectal bleeding for 1-2 years, described as intermittent, mild, dark red blood on outside of stool and mixed in, associated with chronic constipation, and feeling of a mass at rectum while having a BM, and so he took a picture, was concerned about appearance of rectum after BM   s/p     Review of Systems:  Review of Systems: General:	no fever, weight loss,  chills  Skin: no rash, ulcers  Ophthalmologic: no visual changes  ENMT:	no sore throat  Respiratory and Thorax: no cough, wheeze,  sob  Cardiovascular:	no chest pain, palpitations, dizziness  Gastrointestinal:	no nausea, vomiting, diarrhea, +abd pain  Genitourinary:	no dysuria, hematuria  Musculoskeletal:	no joint pains  Neurological:	 no speech disturbance, focal weakness, numbness  Psychiatric:	no depression, anxiety, psychosis  Hematology/Lymphatics:	no anemia  Endocrine:	no polyuria, polydipsia      Allergies and Intolerances:        Allergies:  	No Known Allergies Patient is a 32yo M admitted with c/o rectal bleeding for 1-2 years, described as intermittent, mild, dark red blood on outside of stool and mixed in, associated with chronic constipation, and feeling of a mass at rectum while having a BM, and so he took a picture, was concerned about appearance of rectum after BM   s/p     Review of Systems:  Review of Systems: General:	no fever, weight loss,  chills  Skin: no rash, ulcers  Ophthalmologic: no visual changes  ENMT:	no sore throat  Respiratory and Thorax: no cough, wheeze,  sob  Cardiovascular:	no chest pain, palpitations, dizziness  Gastrointestinal:	no nausea, vomiting, diarrhea, +abd pain  Genitourinary:	no dysuria, hematuria  Musculoskeletal:	no joint pains  Neurological:	 no speech disturbance, focal weakness, numbness  Psychiatric:	no depression, anxiety, psychosis  Hematology/Lymphatics:	no anemia  Endocrine:	no polyuria, polydipsia      Allergies and Intolerances:        Allergies:  	No Known Allergies  VITAL SIGNS:   T(F): 96.6 (02-04-21 @ 05:19), Max: 98.7 (02-03-21 @ 12:23)  HR: 68 (02-04-21 @ 05:19) (56 - 81)  BP: 101/54 (02-04-21 @ 05:19) (101/54 - 121/73)  RR: 16 (02-04-21 @ 05:19) (16 - 20)  SpO2: 99% (02-03-21 @ 16:39) (98% - 99%)    PHYSICAL EXAM:  GENERAL: AAOx3, no acute distress.  HEAD:  Atraumatic, Normocephalic  EYES: conjunctiva and sclera clear  NECK: Supple, no JVD or thyromegaly  CHEST/LUNG: Clear to auscultation bilaterally; No wheeze, rhonchi, or rales  HEART: Regular rate and rhythm; normal S1, S2, No murmurs.  ABDOMEN: Soft, nontender, nondistended; Bowel sounds present, no abdominal bruit, masses, or hepatosplenomegaly  NEUROLOGY: No asterixis or tremor.   SKIN: Intact, no jaundice Patient is a 30yo M admitted with c/o rectal bleeding for 1-2 years, described as intermittent, mild, dark red blood on outside of stool and mixed in, associated with chronic constipation, and feeling of a mass at rectum while having a BM, and so he took a picture, was concerned about appearance of rectum after BM   s/p     Review of Systems:  Review of Systems: General:	no fever, weight loss,  chills  Skin: no rash, ulcers  Ophthalmologic: no visual changes  ENMT:	no sore throat  Respiratory and Thorax: no cough, wheeze,  sob  Cardiovascular:	no chest pain, palpitations, dizziness  Gastrointestinal:	no nausea, vomiting, diarrhea, +abd pain  Genitourinary:	no dysuria, hematuria  Musculoskeletal:	no joint pains  Neurological:	 no speech disturbance, focal weakness, numbness  Psychiatric:	no depression, anxiety, psychosis  Hematology/Lymphatics:	no anemia  Endocrine:	no polyuria, polydipsia      Allergies and Intolerances:        Allergies:  	No Known Allergies  VITAL SIGNS:   T(F): 96.6 (02-04-21 @ 05:19), Max: 98.7 (02-03-21 @ 12:23)  HR: 68 (02-04-21 @ 05:19) (56 - 81)  BP: 101/54 (02-04-21 @ 05:19) (101/54 - 121/73)  RR: 16 (02-04-21 @ 05:19) (16 - 20)  SpO2: 99% (02-03-21 @ 16:39) (98% - 99%)    PHYSICAL EXAM:  GENERAL: AAOx3, no acute distress.  HEAD:  Atraumatic, Normocephalic  EYES: conjunctiva and sclera clear  NECK: Supple, no JVD or thyromegaly  CHEST/LUNG: Clear to auscultation bilaterally; No wheeze, rhonchi, or rales  HEART: Regular rate and rhythm; normal S1, S2, No murmurs.  ABDOMEN: Soft, nontender, nondistended; Bowel sounds present, no abdominal bruit, masses, or hepatosplenomegaly  NEUROLOGY: No asterixis or tremor.   SKIN: Intact, no jaundice                        13.7   5.08  )-----------( 219      ( 05 Feb 2021 06:48 )             41.2   02-05    141  |  101  |  7<L>  ----------------------------<  90  4.0   |  31  |  1.1    Ca    8.7      05 Feb 2021 06:48    TPro  5.8<L>  /  Alb  3.9  /  TBili  0.4  /  DBili  x   /  AST  15  /  ALT  17  /  AlkPhos  60  02-05   Patient is a 32yo M admitted with c/o rectal bleeding for 1-2 years, described as intermittent, mild, dark red blood on outside of stool and mixed in, associated with chronic constipation, and feeling of a mass at rectum while having a BM, and so he took a picture, was concerned about appearance of rectum after BM   s/p colonoscopy 2/5/21-- showing  a large  rectal tumor, starting  at  the  anal verge -up to 20 cm into the rectosigmoid, necrotic, ulcerated, and  friable suggestive  of  adenocarcinoma vs inflamatory polyp     Review of Systems:  Review of Systems: General:	no fever, weight loss,  chills  Skin: no rash, ulcers  Ophthalmologic: no visual changes  ENMT:	no sore throat  Respiratory and Thorax: no cough, wheeze,  sob  Cardiovascular:	no chest pain, palpitations, dizziness  Gastrointestinal:	no nausea, vomiting, diarrhea, +abd pain  Genitourinary:	no dysuria, hematuria  Musculoskeletal:	no joint pains  Neurological:	 no speech disturbance, focal weakness, numbness  Psychiatric:	no depression, anxiety, psychosis  Hematology/Lymphatics:	no anemia  Endocrine:	no polyuria, polydipsia      Allergies and Intolerances:        Allergies:  	No Known Allergies  VITAL SIGNS:   T(F): 97.9  HR: 67  BP: 130/72  RR: 16 (  PHYSICAL EXAM:  GENERAL: AAOx3, no acute distress.  HEAD:  Atraumatic, Normocephalic  EYES: conjunctiva and sclera clear  NECK: Supple, no JVD or thyromegaly  CHEST/LUNG: Clear to auscultation bilaterally; No wheeze, rhonchi, or rales  HEART: Regular rate and rhythm; normal S1, S2, No murmurs.  ABDOMEN: Soft, nontender, nondistended; Bowel sounds present, no hepatosplenomegaly  NEUROLOGY: No asterixis or tremor.   SKIN: Intact, no jaundice                        13.7   5.08  )-----------( 219      ( 05 Feb 2021 06:48 )             41.2   02-05    141  |  101  |  7<L>  ----------------------------<  90  4.0   |  31  |  1.1    Ca    8.7      05 Feb 2021 06:48    TPro  5.8<L>  /  Alb  3.9  /  TBili  0.4  /  DBili  x   /  AST  15  /  ALT  17  /  AlkPhos  60  02-05   Patient is a 30yo M  no significant  pmh, psh-gastroschisis as infant ,admitted with c/o rectal bleeding for 1-2 years, described as intermittent, mild, dark red blood on outside of stool and mixed in, associated with chronic constipation, and feeling of a mass at rectum while having a BM, and so he took a picture, was concerned about appearance of rectum after BM   s/p colonoscopy 2/5/21-- showing  a large  rectal tumor, starting  at  the  anal verge -up to 20 cm into the rectosigmoid, necrotic, ulcerated, and  friable suggestive  of  adenocarcinoma vs inflammatory polyp     Review of Systems:  Review of Systems: General:	no fever, weight loss,  chills  Skin: no rash, ulcers  Ophthalmologic: no visual changes  ENMT:	no sore throat  Respiratory and Thorax: no cough, wheeze,  sob  Cardiovascular:	no chest pain, palpitations, dizziness  Gastrointestinal:	no nausea, vomiting, diarrhea, +abd pain  Genitourinary:	no dysuria, hematuria  Musculoskeletal:	no joint pains  Neurological:	 no speech disturbance, focal weakness, numbness  Psychiatric:	no depression, anxiety, psychosis  Hematology/Lymphatics:	no anemia  Endocrine:	no polyuria, polydipsia      Allergies and Intolerances:        Allergies:  	No Known Allergies  VITAL SIGNS:   T(F): 97.9  HR: 67  BP: 130/72  RR: 16 (  PHYSICAL EXAM:  GENERAL: AAOx3, no acute distress.  HEAD:  Atraumatic, Normocephalic  EYES: conjunctiva and sclera clear  NECK: Supple, no JVD or thyromegaly  CHEST/LUNG: Clear to auscultation bilaterally; No wheeze, rhonchi, or rales  HEART: Regular rate and rhythm; normal S1, S2, No murmurs.  ABDOMEN: Soft, nontender, nondistended; Bowel sounds present, no hepatosplenomegaly  NEUROLOGY: No asterixis or tremor.   SKIN: Intact, no jaundice                        13.7   5.08  )-----------( 219      ( 05 Feb 2021 06:48 )             41.2   02-05    141  |  101  |  7<L>  ----------------------------<  90  4.0   |  31  |  1.1    Ca    8.7      05 Feb 2021 06:48    TPro  5.8<L>  /  Alb  3.9  /  TBili  0.4  /  DBili  x   /  AST  15  /  ALT  17  /  AlkPhos  60  02-05

## 2021-02-06 NOTE — CONSULT NOTE ADULT - ATTENDING COMMENTS
Patient is in no acute distress. He is aware of rectal mass and will have follow up with Colorectal surgeon

## 2021-02-06 NOTE — CONSULT NOTE ADULT - ASSESSMENT
30yo M admitted with s/p colonoscopy 2/5/21-- showing  a large  rectal tumor, starting  at  the  anal verge -up to 20 cm into the rectosigmoid, necrotic, ulcerated, and  friable suggestive  of  adenocarcinoma vs  inflam polyp    DIET---REGULAR  IVF  NS  PAIN MGMT  PROTONIX   SCDS  WILL FOLLOW

## 2021-02-07 ENCOUNTER — TRANSCRIPTION ENCOUNTER (OUTPATIENT)
Age: 31
End: 2021-02-07

## 2021-02-07 VITALS
TEMPERATURE: 97 F | SYSTOLIC BLOOD PRESSURE: 107 MMHG | HEART RATE: 61 BPM | DIASTOLIC BLOOD PRESSURE: 58 MMHG | RESPIRATION RATE: 16 BRPM

## 2021-02-07 PROCEDURE — 99239 HOSP IP/OBS DSCHRG MGMT >30: CPT

## 2021-02-07 PROCEDURE — 99251: CPT

## 2021-02-07 NOTE — DISCHARGE NOTE PROVIDER - PROVIDER TOKENS
PROVIDER:[TOKEN:[[id number]],FOLLOWUP:[1 week]],PROVIDER:[TOKEN:[[id number]],FOLLOWUP:[1 week]],PROVIDER:[TOKEN:[[id number]],FOLLOWUP:[2 weeks]]

## 2021-02-07 NOTE — PROGRESS NOTE ADULT - SUBJECTIVE AND OBJECTIVE BOX
GEOFFJAROCHO PERSAUD  31y  Male      Patient is a 31y old  Male who presents with a chief complaint of     INTERVAL HPI/OVERNIGHT EVENTS:  pt seen and examined at bedside   -doing well  -EGD and Colonoscopy today; will follow GI reccs     REVIEW OF SYSTEMS:  -no complaints     Vital Signs Last 24 Hrs  T(C): 36 (05 Feb 2021 18:16), Max: 36.8 (05 Feb 2021 14:32)  T(F): 96.8 (05 Feb 2021 18:16), Max: 98.2 (05 Feb 2021 14:32)  HR: 55 (05 Feb 2021 18:16) (55 - 68)  BP: 107/67 (05 Feb 2021 18:16) (102/63 - 115/62)  RR: 18 (05 Feb 2021 18:16) (16 - 18)  SpO2: 97% (05 Feb 2021 12:02) (97% - 97%)    PHYSICAL EXAM:  GENERAL: NAD, well-groomed, well-developed  HEAD:  Atraumatic, Normocephalic  EYES: EOMI, PERRLA, conjunctiva and sclera clear  NERVOUS SYSTEM:  Alert & Oriented X 4, Good concentration; Motor Strength 5/5 B/L upper and lower extremities; DTRs 2+ intact and symmetric  CHEST/LUNG: Clear to percussion bilaterally; No rales, rhonchi, wheezing, or rubs  CV/HEART: Regular rate and rhythm; No murmurs, rubs, or gallops  GI/ABDOMEN: Soft, Nontender, Nondistended; Bowel sounds present  EXTREMITIES:  2+ Peripheral Pulses, No clubbing, cyanosis, or edema  SKIN: No rashes or lesions    LAB:                        14.1   6.57  )-----------( 218      ( 04 Feb 2021 06:23 )             42.1     02-03    139  |  102  |  9<L>  ----------------------------<  98  4.7   |  28  |  0.9    Ca    9.0      03 Feb 2021 12:45    TPro  6.8  /  Alb  4.4  /  TBili  0.3  /  DBili  x   /  AST  20  /  ALT  22  /  AlkPhos  68  02-03    Daily Height in cm: 167.64 (03 Feb 2021 19:06)    Daily   CAPILLARY BLOOD GLUCOSE    LIVER FUNCTIONS - ( 03 Feb 2021 12:45 )  Alb: 4.4 g/dL / Pro: 6.8 g/dL / ALK PHOS: 68 U/L / ALT: 22 U/L / AST: 20 U/L / GGT: x           RADIOLOGY:    Imaging Personally Reviewed:  [ Y ] YES  [ ] NO    HEALTH ISSUES - PROBLEM Dx:  DVT prophylaxis  DVT prophylaxis    Gastrointestinal hemorrhage, unspecified gastrointestinal hemorrhage type  Gastrointestinal hemorrhage, unspecified gastrointestinal hemorrhage type    Rectal prolapse  Rectal prolapse    MEDS:  acetaminophen   Tablet .. 650 milliGRAM(s) Oral every 4 hours PRN  bisacodyl 10 milliGRAM(s) Oral <User Schedule>  pantoprazole    Tablet 40 milliGRAM(s) Oral before breakfast        
.  Patient seen & examined with Dr. Negron this am.  No acute events noted overnight.  Patient reports no pain but difficulty with BM's.    Patient tolerates diet well.   Patient denies subjective fever, chills, tremors, N/V/D, CP or SOB.           MEDICATIONS  (STANDING):  pantoprazole    Tablet 40 milliGRAM(s) Oral before breakfast  sodium chloride 0.9%. 1000 milliLiter(s) (75 mL/Hr) IV Continuous <Continuous>    MEDICATIONS  (PRN):  acetaminophen   Tablet .. 650 milliGRAM(s) Oral every 4 hours PRN Mild Pain (1 - 3)          Vital Signs Last 24 Hrs  T(C): 36.3 (07 Feb 2021 05:16), Max: 36.8 (06 Feb 2021 21:08)  T(F): 97.4 (07 Feb 2021 05:16), Max: 98.3 (06 Feb 2021 21:08)  HR: 61 (07 Feb 2021 05:16) (61 - 80)  BP: 107/58 (07 Feb 2021 05:16) (107/58 - 130/72)  RR: 16 (07 Feb 2021 05:16) (16 - 16)        Physical Exam:  General:  WD, WN, conversant in NAD.   Abdomen:  + Bowel sounds, soft, no distention, non-tender,  no rebound/guarding or peritoneal signs.      
31yMale  Being followed for rectal bleeding, intermittent melenic stools, rectal prolapse?  Interval history: Patient denies nausea, vomiting, hematemesis, melena, blood in stool, diarrhea, constipation, abdominal pain. Patient comfortable without rectal bleeding overnight. Patient tolerating clear liquid diet.      PAST MEDICAL & SURGICAL HISTORY:   No pertinent past medical history    History of gastroschisis  surgically corrected as infant            Social History: No smoking. No alcohol. No illegal drug use.          MEDICATIONS  (STANDING):  bisacodyl 10 milliGRAM(s) Oral <User Schedule>  pantoprazole    Tablet 40 milliGRAM(s) Oral before breakfast  polyethylene glycol/electrolyte Solution. 4000 milliLiter(s) Oral once    MEDICATIONS  (PRN):  acetaminophen   Tablet .. 650 milliGRAM(s) Oral every 4 hours PRN Mild Pain (1 - 3)      Allergies:   No Known Allergies            REVIEW OF SYSTEMS:  General:  No weight loss, fevers, or chills.  Eyes:  No reported pain or visual changes  ENT:  No sore throat or runny nose.  NECK: No stiffness   CV:  No chest pain or palpitations.  Resp:  No shortness of breath, cough  GI:  No abdominal pain, nausea, vomiting, dysphagia, diarrhea or constipation. No rectal bleeding, melena, or hematemesis.  Neuro:  No tingling, numbness         VITAL SIGNS:   T(F): 96.6 (02-04-21 @ 05:19), Max: 98.7 (02-03-21 @ 12:23)  HR: 68 (02-04-21 @ 05:19) (56 - 81)  BP: 101/54 (02-04-21 @ 05:19) (101/54 - 121/73)  RR: 16 (02-04-21 @ 05:19) (16 - 20)  SpO2: 99% (02-03-21 @ 16:39) (98% - 99%)    PHYSICAL EXAM:  GENERAL: AAOx3, no acute distress.  HEAD:  Atraumatic, Normocephalic  EYES: conjunctiva and sclera clear  NECK: Supple, no JVD or thyromegaly  CHEST/LUNG: Clear to auscultation bilaterally; No wheeze, rhonchi, or rales  HEART: Regular rate and rhythm; normal S1, S2, No murmurs.  ABDOMEN: Soft, nontender, nondistended; Bowel sounds present, no abdominal bruit, masses, or hepatosplenomegaly  NEUROLOGY: No asterixis or tremor.   SKIN: Intact, no jaundice            LABS:                        14.1   6.57  )-----------( 218      ( 04 Feb 2021 06:23 )             42.1     02-04    141  |  103  |  14  ----------------------------<  85  4.6   |  29  |  0.9    Ca    9.0      04 Feb 2021 06:23  Phos  5.3     02-04  Mg     2.2     02-04    TPro  5.9<L>  /  Alb  4.0  /  TBili  0.2  /  DBili  x   /  AST  17  /  ALT  19  /  AlkPhos  64  02-04    LIVER FUNCTIONS - ( 04 Feb 2021 06:23 )  Alb: 4.0 g/dL / Pro: 5.9 g/dL / ALK PHOS: 64 U/L / ALT: 19 U/L / AST: 17 U/L / GGT: x           PT/INR - ( 04 Feb 2021 06:23 )   PT: 11.80 sec;   INR: 1.03 ratio         PTT - ( 04 Feb 2021 06:23 )  PTT:32.8 sec    IMAGING:    < from: CT Abdomen and Pelvis w/ Oral Cont and w/ IV Cont (01.25.21 @ 17:04) >    EXAM:  CT ABDOMEN AND PELVIS OC IC            PROCEDURE DATE:  01/25/2021            INTERPRETATION:  CLINICAL STATEMENT: Abdominal pain.    TECHNIQUE: Contiguous axial CT images were obtained from the lower chest to the pubic symphysis following administration of 95 cc Optiray 320 intravenous contrast.  5 cc of contrast were discarded. Oral contrast was administered. Reformatted images in the coronal and sagittal planes were acquired.    COMPARISON CT: None    FINDINGS:    LOWER CHEST: Bibasilar subsegmental atelectasis.    HEPATOBILIARY: Unremarkable.    SPLEEN: Unremarkable.    PANCREAS: Unremarkable.    ADRENAL GLANDS: Unremarkable.    KIDNEYS: Symmetric renal enhancement. No hydronephrosis.    ABDOMINOPELVIC NODES: No enlarged abdominal or pelvic lymph nodes.    PELVIC ORGANS: Distended urinary bladder    PERITONEUM/MESENTERY/BOWEL: No bowel obstruction, ascites or free intraperitoneal air. The cecum and appendix are located within the right mid upper quadrant. The appendix is normal caliber (601/25-45). There is wall thickening of the rectum.    BONES/SOFT TISSUES: No acute osseous abnormality.      IMPRESSION:  Moderately distended urinary bladder, nonspecific.    Normal caliber appendix.    Wall thickening of the rectum without definite adjacent fatty stranding. Differential includes inflammatory etiology. Correlation with patient's symptoms and direct visualization suggested.              MEGHAN OLIVEIRA MD; Attending Radiologist  This document has been electronically signed. Jan 25 2021  6:51PM    < end of copied text >        
  JAROCHO YOUSIF  31y  Male      Patient is a 31y old  Male who presents with a chief complaint of     INTERVAL HPI/OVERNIGHT EVENTS:  pt seen and examined at bedside   -doing well  -EGD and Colonoscopy tomorrow     REVIEW OF SYSTEMS:  -no complaints     -Vital Signs Last 24 Hrs  T(C): 35.9 (04 Feb 2021 05:19), Max: 37.1 (03 Feb 2021 12:23)  T(F): 96.6 (04 Feb 2021 05:19), Max: 98.7 (03 Feb 2021 12:23)  HR: 68 (04 Feb 2021 05:19) (56 - 81)  BP: 101/54 (04 Feb 2021 05:19) (101/54 - 121/73)  RR: 16 (04 Feb 2021 05:19) (16 - 20)  SpO2: 99% (03 Feb 2021 16:39) (98% - 99%)    PHYSICAL EXAM:  GENERAL: NAD, well-groomed, well-developed  HEAD:  Atraumatic, Normocephalic  EYES: EOMI, PERRLA, conjunctiva and sclera clear  NERVOUS SYSTEM:  Alert & Oriented X 4, Good concentration; Motor Strength 5/5 B/L upper and lower extremities; DTRs 2+ intact and symmetric  CHEST/LUNG: Clear to percussion bilaterally; No rales, rhonchi, wheezing, or rubs  CV/HEART: Regular rate and rhythm; No murmurs, rubs, or gallops  GI/ABDOMEN: Soft, Nontender, Nondistended; Bowel sounds present  EXTREMITIES:  2+ Peripheral Pulses, No clubbing, cyanosis, or edema  SKIN: No rashes or lesions    LAB:                        14.1   6.57  )-----------( 218      ( 04 Feb 2021 06:23 )             42.1     02-03    139  |  102  |  9<L>  ----------------------------<  98  4.7   |  28  |  0.9    Ca    9.0      03 Feb 2021 12:45    TPro  6.8  /  Alb  4.4  /  TBili  0.3  /  DBili  x   /  AST  20  /  ALT  22  /  AlkPhos  68  02-03    Daily Height in cm: 167.64 (03 Feb 2021 19:06)    Daily   CAPILLARY BLOOD GLUCOSE    LIVER FUNCTIONS - ( 03 Feb 2021 12:45 )  Alb: 4.4 g/dL / Pro: 6.8 g/dL / ALK PHOS: 68 U/L / ALT: 22 U/L / AST: 20 U/L / GGT: x           RADIOLOGY:    Imaging Personally Reviewed:  [ Y ] YES  [ ] NO    HEALTH ISSUES - PROBLEM Dx:  DVT prophylaxis  DVT prophylaxis    Gastrointestinal hemorrhage, unspecified gastrointestinal hemorrhage type  Gastrointestinal hemorrhage, unspecified gastrointestinal hemorrhage type    Rectal prolapse  Rectal prolapse    MEDS:  acetaminophen   Tablet .. 650 milliGRAM(s) Oral every 4 hours PRN  bisacodyl 10 milliGRAM(s) Oral <User Schedule>  pantoprazole    Tablet 40 milliGRAM(s) Oral before breakfast        
  JAROCHO YOUSIF  31y  Male      Patient is a 31y old  Male who presents with a chief complaint of     INTERVAL HPI/OVERNIGHT EVENTS:  pt seen and examined at bedside   -doing well  -aware of the newly diagnosed rectal mass on colonoscopy   -CT chest with IV contrast ordered  -discussed with surgery PA --- no urgent consult; will follow reccs from colorectal surgeon    REVIEW OF SYSTEMS:  -no complaints     Vital Signs Last 24 Hrs  T(C): 36.6 (06 Feb 2021 14:29), Max: 36.8 (05 Feb 2021 16:32)  T(F): 97.9 (06 Feb 2021 14:29), Max: 97.9 (06 Feb 2021 14:29)  HR: 67 (06 Feb 2021 14:29) (55 - 76)  BP: 130/72 (06 Feb 2021 14:29) (95/50 - 130/72)  RR: 16 (06 Feb 2021 14:29) (16 - 18)    PHYSICAL EXAM:  GENERAL: NAD, well-groomed, well-developed  HEAD:  Atraumatic, Normocephalic  EYES: EOMI, PERRLA, conjunctiva and sclera clear  NERVOUS SYSTEM:  Alert & Oriented X 4, Good concentration; Motor Strength 5/5 B/L upper and lower extremities; DTRs 2+ intact and symmetric  CHEST/LUNG: Clear to percussion bilaterally; No rales, rhonchi, wheezing, or rubs  CV/HEART: Regular rate and rhythm; No murmurs, rubs, or gallops  GI/ABDOMEN: Soft, Nontender, Nondistended; Bowel sounds present  EXTREMITIES:  2+ Peripheral Pulses, No clubbing, cyanosis, or edema  SKIN: No rashes or lesions    LAB:                          13.7   5.08  )-----------( 219      ( 05 Feb 2021 06:48 )             41.2   02-05    141  |  101  |  7<L>  ----------------------------<  90  4.0   |  31  |  1.1    Ca    8.7      05 Feb 2021 06:48    TPro  5.8<L>  /  Alb  3.9  /  TBili  0.4  /  DBili  x   /  AST  15  /  ALT  17  /  AlkPhos  60  02-05                          14.1   6.57  )-----------( 218      ( 04 Feb 2021 06:23 )             42.1     02-03    139  |  102  |  9<L>  ----------------------------<  98  4.7   |  28  |  0.9    Ca    9.0      03 Feb 2021 12:45    TPro  6.8  /  Alb  4.4  /  TBili  0.3  /  DBili  x   /  AST  20  /  ALT  22  /  AlkPhos  68  02-03    Daily Height in cm: 167.64 (03 Feb 2021 19:06)    Daily   CAPILLARY BLOOD GLUCOSE    LIVER FUNCTIONS - ( 03 Feb 2021 12:45 )  Alb: 4.4 g/dL / Pro: 6.8 g/dL / ALK PHOS: 68 U/L / ALT: 22 U/L / AST: 20 U/L / GGT: x           RADIOLOGY:    Imaging Personally Reviewed:  [ Y ] YES  [ ] NO    HEALTH ISSUES - PROBLEM Dx:  DVT prophylaxis  DVT prophylaxis    Gastrointestinal hemorrhage, unspecified gastrointestinal hemorrhage type  Gastrointestinal hemorrhage, unspecified gastrointestinal hemorrhage type    Rectal prolapse  Rectal prolapse    MEDICATIONS  (STANDING):  pantoprazole    Tablet 40 milliGRAM(s) Oral before breakfast  sodium chloride 0.9%. 1000 milliLiter(s) (75 mL/Hr) IV Continuous <Continuous>    MEDICATIONS  (PRN):  acetaminophen   Tablet .. 650 milliGRAM(s) Oral every 4 hours PRN Mild Pain (1 - 3)

## 2021-02-07 NOTE — DISCHARGE NOTE NURSING/CASE MANAGEMENT/SOCIAL WORK - PATIENT PORTAL LINK FT
You can access the FollowMyHealth Patient Portal offered by [facility] by registering at the following website: [email or web address]. By joining [employer]’s FollowMyHealth portal, you will also be able to view your health information using other applications (apps) compatible with our system.

## 2021-02-07 NOTE — DISCHARGE NOTE PROVIDER - CARE PROVIDERS DIRECT ADDRESSES
[email or web address]

## 2021-02-07 NOTE — PROGRESS NOTE ADULT - ASSESSMENT
Impression:  30 y/o male with new diagnosis of rectal mass.        Plan:  - Patient seen and examined with Dr. Negron and he explained to patient he should follow up with either Dr. Cheney or Dr. Lee at the [facility] after discharge for evaluation and further treatment since they are Colorectal surgeons and this may be a high risk of being rectal cancer.     - Patient verbalizes understanding of Dr. Negron's plan & recommendations and all questions answered to patient's satisfaction.     - Dr. Negron discussed with Dr. Mckeon (hospitalist) and she will discharge today with the follow up with Dr. Cheney or Jesus.

## 2021-02-07 NOTE — DISCHARGE NOTE PROVIDER - HOSPITAL COURSE
Admit Date:  2/3/2021   Patient is a 30yo M presenting to ED today with c/o rectal bleeding for 1-2 years, described as intermittent, mild, dark red blood on outside of stool and mixed in, associated with chronic constipation, and feeling of a mass at rectum while having a BM, and so he took a picture yesterday and was concerned about appearance of rectum after BM so came to ED today.  Patient had some mild abd and back pain yesterday. Patient denies any fever, chills, nausea, or vomiting.      1) New Rectal mass /Lower GI bleed  -cbc stable  -CT imaging studies to r/o metastases  -Colorectal surgery consult --- discussed with surgical PA     gi ppx      Admit Date:  2/3/2021   Patient is a 32yo M presenting to ED today with c/o rectal bleeding for 1-2 years, described as intermittent, mild, dark red blood on outside of stool and mixed in, associated with chronic constipation, and feeling of a mass at rectum while having a BM, and so he took a picture yesterday and was concerned about appearance of rectum after BM so came to ED 2/3/2021 for evaluation.  Patient had some mild abd and back pain yesterday. Patient denies any fever, chills, nausea, or vomiting.      1) New Rectal mass /Lower GI bleed  -EGD revealed Rectal mass: new occurrence    CT Abdomen and Pelvis w/ Oral Cont and w/ IV Cont (01.25.21)    IMPRESSION:  Moderately distended urinary bladder, nonspecific.    Normal caliber appendix.    **Wall thickening of the rectum without definite adjacent fatty stranding. Differential includes inflammatory etiology. Correlation with patient's symptoms and direct visualization suggested.     CT Chest w/ IV Cont (02.05.21 @ 21:49)     IMPRESSION:    Left lower lobe 0.3 cm solid pulmonary nodule. Follow-up per oncology protocol recommended.    Surgery consulted: patient will need to follow up with Dr Cheney or Dr Diaz in 1 week for surgical plan.  Gastroenterology:  follow up with Dr Arteaga in 1 week  Medical Follow up: make appointment with Dr Farrell at [facility] clinic at the [facility].                    -Colorectal surgery consult ---      gi ppx      Admit Date:  2/3/2021   Patient is a 30yo M presenting to ED today with c/o rectal bleeding for 1-2 years, described as intermittent, mild, dark red blood on outside of stool and mixed in, associated with chronic constipation, and feeling of a mass at rectum while having a BM, and so he took a picture yesterday and was concerned about appearance of rectum after BM so came to ED 2/3/2021 for evaluation.  Patient had some mild abd and back pain yesterday. Patient denies any fever, chills, nausea, or vomiting.      1) New Rectal mass /Lower GI bleed  -EGD revealed Rectal mass: new occurrence    CT Abdomen and Pelvis w/ Oral Cont and w/ IV Cont (01.25.21)    IMPRESSION:  Moderately distended urinary bladder, nonspecific.    Normal caliber appendix.    **Wall thickening of the rectum without definite adjacent fatty stranding. Differential includes inflammatory etiology. Correlation with patient's symptoms and direct visualization suggested.     CT Chest w/ IV Cont (02.05.21 @ 21:49)     IMPRESSION:    Left lower lobe 0.3 cm solid pulmonary nodule. Follow-up per oncology protocol recommended.    Surgery consulted: patient will need to follow up with Dr Cheney or Dr Diaz in 1 week for surgical plan.  Gastroenterology:  follow up with Dr Arteaga in 1 week  Medical Follow up: make appointment with Dr Farrell at [facility] clinic at the [facility].        -Colorectal surgery consult ---      gi ppx     pt seen and examined at bedside this morning multiple times   -aware of the new diagnosis of rectal mass  -aware of the lung nodule  -pt was given copies of imaging studies and lab work  -he was seen by surgery group today  -follow up doctor numbers given to him  -patient is aware to follow up on biopsies results from colonoscopy this admission and will follow up with Dr. Arteaga     spent over 45 mins d/c planning today

## 2021-02-07 NOTE — DISCHARGE NOTE PROVIDER - CARE PROVIDER_API CALL
Patel Cheney)  ColonRectal Surgery  [address], [address] [place] [address]  Phone: [phone]  Fax: [phone]  Follow Up Time: 1 week    India Arteaga)  Gastroenterology  [address] [place] [address]  Phone: [phone]  Fax: [phone]  Follow Up Time: 1 week    Kerwin Farrell (DO)  Medicine  Physicians  [address] [place] [address]  Phone: [phone]  Fax: [phone]  Follow Up Time: 2 weeks

## 2021-02-07 NOTE — DISCHARGE NOTE PROVIDER - NSDCCPCAREPLAN_GEN_ALL_CORE_FT
PRINCIPAL DISCHARGE DIAGNOSIS  Diagnosis: GI bleed  Assessment and Plan of Treatment: due to new rectal mass, Will need follow up with Surgeon: Dr Cheney/Dr Diaz within 1 week, follow up with GI: Dr Arteaga within 1 week. Set appointment at [facility] clinic with Dr Farrell for medical follow up.       PRINCIPAL DISCHARGE DIAGNOSIS  Diagnosis: GI bleed  Assessment and Plan of Treatment: due to new rectal mass, Will need follow up with Surgeon: Dr Cheney/Dr Diaz within 1 week, follow up with GI: Dr Arteaga within 1 week. Set appointment at [facility] with Dr Farrell for medical follow up.      SECONDARY DISCHARGE DIAGNOSES  Diagnosis: Solitary pulmonary nodule  Assessment and Plan of Treatment: Will need to follow up at [facility] clinic for evaluation of this LLL solitary pulmonary nodule seen on CT scan. Patient needs to make appointment

## 2021-02-08 PROBLEM — Z00.00 ENCOUNTER FOR PREVENTIVE HEALTH EXAMINATION: Status: ACTIVE | Noted: 2021-02-08

## 2021-02-09 LAB — SURGICAL PATHOLOGY STUDY: SIGNIFICANT CHANGE UP

## 2021-02-12 PROBLEM — F17.200 NICOTINE DEPENDENCE, UNSPECIFIED, UNCOMPLICATED: Chronic | Status: ACTIVE | Noted: 2021-02-05

## 2021-02-12 LAB — SURGICAL PATHOLOGY STUDY: SIGNIFICANT CHANGE UP

## 2021-02-16 DIAGNOSIS — K29.60 OTHER GASTRITIS WITHOUT BLEEDING: ICD-10-CM

## 2021-02-16 DIAGNOSIS — K62.89 OTHER SPECIFIED DISEASES OF ANUS AND RECTUM: ICD-10-CM

## 2021-02-16 DIAGNOSIS — Z87.891 PERSONAL HISTORY OF NICOTINE DEPENDENCE: ICD-10-CM

## 2021-02-16 DIAGNOSIS — N32.89 OTHER SPECIFIED DISORDERS OF BLADDER: ICD-10-CM

## 2021-02-16 DIAGNOSIS — R91.1 SOLITARY PULMONARY NODULE: ICD-10-CM

## 2021-02-16 DIAGNOSIS — K62.5 HEMORRHAGE OF ANUS AND RECTUM: ICD-10-CM

## 2021-02-18 ENCOUNTER — APPOINTMENT (OUTPATIENT)
Dept: SURGERY | Facility: CLINIC | Age: 31
End: 2021-02-18
Payer: MEDICAID

## 2021-02-18 VITALS
SYSTOLIC BLOOD PRESSURE: 110 MMHG | TEMPERATURE: 97.3 F | HEART RATE: 76 BPM | DIASTOLIC BLOOD PRESSURE: 74 MMHG | BODY MASS INDEX: 32.96 KG/M2 | WEIGHT: 210 LBS | HEIGHT: 67 IN

## 2021-02-18 PROCEDURE — 99212 OFFICE O/P EST SF 10 MIN: CPT | Mod: 25

## 2021-02-18 PROCEDURE — 46600 DIAGNOSTIC ANOSCOPY SPX: CPT

## 2021-02-23 ENCOUNTER — NON-APPOINTMENT (OUTPATIENT)
Age: 31
End: 2021-02-23

## 2021-02-24 NOTE — ASSESSMENT
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 The mass does bleed and prolapse. For this reason I do recommend transanal excision. The risks benefits and alternatives were discussed with the patient. The risk of bleeding as well as infection. He understands that he could have a fair amount of discomfort after the surgery. All questions were answered. Informed consent was obtained. Mr. STILES will be scheduled for surgery at his earliest convenience.

## 2021-02-24 NOTE — PHYSICAL EXAM
[None] : no anal fissures seen [Excoriation] : excoriations [Normal] : was normal [___ Posterior] : a [unfilled] ~Ucm rectal mass was present posteriorly [Oriented to Person] : oriented to person [Alert] : alert [Oriented to Place] : oriented to place [Oriented to Time] : oriented to time [Calm] : calm [Multiple Sinus Tracts] : no perianal sinus tracts [Fistula] : no fistulas [Wart] : no warts [Ulcer ___ cm] : no ulcers [Pilonidal Cyst] : no pilonidal cysts [Pilonidal Sinus] : no pilonidal sinus [Pilonidal Sinus Draining] : no pilonidal sinus drainage [de-identified] : Healthy male, NAD [de-identified] : Full range of motion [de-identified] : No focal deficits.

## 2021-02-24 NOTE — PROCEDURE
[FreeTextEntry1] : Anoscopy performed using a disposable anoscope.  The patient was place in the left lateral decubitus position. After AURELIA was performed the anoscope was inserted and the distal rectum was evaluated along with the anal canal and anal margin.\par \par Findings:\par Contamination of the perineum reveals a relatively normal-appearing perianal tissues.\par \par ER he increase tone, large rectal mass palpable at the tip of the finger.\par \par Anoscopy, large rectal mass visualized orientation could not be appreciated due to the size.

## 2021-02-24 NOTE — HISTORY OF PRESENT ILLNESS
[FreeTextEntry1] : This is a new patient visit for Mr. YOUSIF who is referred with a rectal tumor. Mr. MO states that that he was having no rectal bleeding for over a year and a half. In addition he noted over the last 4 months a mass prolapsing from his rectum. He underwent a colonoscopy which revealed a large distal rectal mass biopsies of which came back as inflammatory. He is referred now for transanal excision of this tumor.

## 2021-02-26 ENCOUNTER — OUTPATIENT (OUTPATIENT)
Dept: OUTPATIENT SERVICES | Facility: HOSPITAL | Age: 31
LOS: 1 days | Discharge: HOME | End: 2021-02-26

## 2021-02-26 VITALS
RESPIRATION RATE: 16 BRPM | HEIGHT: 66 IN | DIASTOLIC BLOOD PRESSURE: 69 MMHG | HEART RATE: 74 BPM | TEMPERATURE: 98 F | WEIGHT: 210.1 LBS | SYSTOLIC BLOOD PRESSURE: 120 MMHG | OXYGEN SATURATION: 96 %

## 2021-02-26 DIAGNOSIS — Z98.890 OTHER SPECIFIED POSTPROCEDURAL STATES: Chronic | ICD-10-CM

## 2021-02-26 DIAGNOSIS — Z01.818 ENCOUNTER FOR OTHER PREPROCEDURAL EXAMINATION: ICD-10-CM

## 2021-02-26 DIAGNOSIS — Z87.738 PERSONAL HISTORY OF OTHER SPECIFIED (CORRECTED) CONGENITAL MALFORMATIONS OF DIGESTIVE SYSTEM: Chronic | ICD-10-CM

## 2021-02-26 DIAGNOSIS — D49.0 NEOPLASM OF UNSPECIFIED BEHAVIOR OF DIGESTIVE SYSTEM: ICD-10-CM

## 2021-02-26 LAB
BASOPHILS # BLD AUTO: 0.02 K/UL — SIGNIFICANT CHANGE UP (ref 0–0.2)
BASOPHILS NFR BLD AUTO: 0.4 % — SIGNIFICANT CHANGE UP (ref 0–1)
EOSINOPHIL # BLD AUTO: 0.06 K/UL — SIGNIFICANT CHANGE UP (ref 0–0.7)
EOSINOPHIL NFR BLD AUTO: 1.1 % — SIGNIFICANT CHANGE UP (ref 0–8)
HCT VFR BLD CALC: 41.5 % — LOW (ref 42–52)
HGB BLD-MCNC: 14.5 G/DL — SIGNIFICANT CHANGE UP (ref 14–18)
IMM GRANULOCYTES NFR BLD AUTO: 0.4 % — HIGH (ref 0.1–0.3)
LYMPHOCYTES # BLD AUTO: 1.58 K/UL — SIGNIFICANT CHANGE UP (ref 1.2–3.4)
LYMPHOCYTES # BLD AUTO: 29.3 % — SIGNIFICANT CHANGE UP (ref 20.5–51.1)
MCHC RBC-ENTMCNC: 28.2 PG — SIGNIFICANT CHANGE UP (ref 27–31)
MCHC RBC-ENTMCNC: 34.9 G/DL — SIGNIFICANT CHANGE UP (ref 32–37)
MCV RBC AUTO: 80.7 FL — SIGNIFICANT CHANGE UP (ref 80–94)
MONOCYTES # BLD AUTO: 0.54 K/UL — SIGNIFICANT CHANGE UP (ref 0.1–0.6)
MONOCYTES NFR BLD AUTO: 10 % — HIGH (ref 1.7–9.3)
NEUTROPHILS # BLD AUTO: 3.18 K/UL — SIGNIFICANT CHANGE UP (ref 1.4–6.5)
NEUTROPHILS NFR BLD AUTO: 58.8 % — SIGNIFICANT CHANGE UP (ref 42.2–75.2)
NRBC # BLD: 0 /100 WBCS — SIGNIFICANT CHANGE UP (ref 0–0)
PLATELET # BLD AUTO: 220 K/UL — SIGNIFICANT CHANGE UP (ref 130–400)
RBC # BLD: 5.14 M/UL — SIGNIFICANT CHANGE UP (ref 4.7–6.1)
RBC # FLD: 12.3 % — SIGNIFICANT CHANGE UP (ref 11.5–14.5)
WBC # BLD: 5.4 K/UL — SIGNIFICANT CHANGE UP (ref 4.8–10.8)
WBC # FLD AUTO: 5.4 K/UL — SIGNIFICANT CHANGE UP (ref 4.8–10.8)

## 2021-02-26 NOTE — H&P PST ADULT - NSICDXPASTSURGICALHX_GEN_ALL_CORE_FT
PAST SURGICAL HISTORY:  H/O colonoscopy     H/O endoscopy     History of gastroschisis surgically corrected as infant

## 2021-02-26 NOTE — H&P PST ADULT - NS ABD PE RECTAL EXAM
Pt having issues with BP . Has an appt  Feb 9th with PCP Dr. Corin Hazel - cardiologist Dr. Stefano Menchaca. not examined

## 2021-02-26 NOTE — H&P PST ADULT - HISTORY OF PRESENT ILLNESS
30 yo male presents with c/o rectal bleeding for past year, "i went to hospital last month they found a tumor in my rectum" pt denies any pain, c/o "pressure in my back radiating to my testicles"  scheduled for transanal excision of rectal tumor.  denies chest pain, palpitations, shortness of breath, dyspnea, or dysuria. exercise tolerance: 2 blocks/ flights of stairs w/o sob  pt denies any known exposure to COVID-19, denies any S&S  pt instructed re: self quarantine guidelines    Anesthesia Alert  NO--Difficult Airway  NO--History of neck surgery or radiation  NO--Limited ROM of neck  NO--History of Malignant hyperthermia  NO--No personal or family history of Pseudocholinesterase deficiency.  NO--Prior Anesthesia Complication  NO--Latex Allergy  NO--Loose teeth  NO--History of Rheumatoid Arthritis  NO--BRENDA  NO--Other_____

## 2021-03-02 ENCOUNTER — NON-APPOINTMENT (OUTPATIENT)
Age: 31
End: 2021-03-02

## 2021-03-16 ENCOUNTER — OUTPATIENT (OUTPATIENT)
Dept: OUTPATIENT SERVICES | Facility: HOSPITAL | Age: 31
LOS: 1 days | Discharge: HOME | End: 2021-03-16

## 2021-03-16 ENCOUNTER — LABORATORY RESULT (OUTPATIENT)
Age: 31
End: 2021-03-16

## 2021-03-16 DIAGNOSIS — Z98.890 OTHER SPECIFIED POSTPROCEDURAL STATES: Chronic | ICD-10-CM

## 2021-03-16 DIAGNOSIS — Z87.738 PERSONAL HISTORY OF OTHER SPECIFIED (CORRECTED) CONGENITAL MALFORMATIONS OF DIGESTIVE SYSTEM: Chronic | ICD-10-CM

## 2021-03-16 DIAGNOSIS — Z11.59 ENCOUNTER FOR SCREENING FOR OTHER VIRAL DISEASES: ICD-10-CM

## 2021-03-19 ENCOUNTER — RESULT REVIEW (OUTPATIENT)
Age: 31
End: 2021-03-19

## 2021-03-19 ENCOUNTER — APPOINTMENT (OUTPATIENT)
Dept: SURGERY | Facility: HOSPITAL | Age: 31
End: 2021-03-19

## 2021-03-19 ENCOUNTER — OUTPATIENT (OUTPATIENT)
Dept: OUTPATIENT SERVICES | Facility: HOSPITAL | Age: 31
LOS: 1 days | Discharge: HOME | End: 2021-03-19
Payer: SUBSIDIZED

## 2021-03-19 VITALS — RESPIRATION RATE: 17 BRPM | DIASTOLIC BLOOD PRESSURE: 62 MMHG | SYSTOLIC BLOOD PRESSURE: 110 MMHG | HEART RATE: 72 BPM

## 2021-03-19 VITALS
DIASTOLIC BLOOD PRESSURE: 59 MMHG | OXYGEN SATURATION: 99 % | WEIGHT: 199.96 LBS | HEART RATE: 79 BPM | HEIGHT: 67 IN | RESPIRATION RATE: 20 BRPM | SYSTOLIC BLOOD PRESSURE: 101 MMHG | TEMPERATURE: 98 F

## 2021-03-19 DIAGNOSIS — Z98.890 OTHER SPECIFIED POSTPROCEDURAL STATES: Chronic | ICD-10-CM

## 2021-03-19 DIAGNOSIS — Z87.738 PERSONAL HISTORY OF OTHER SPECIFIED (CORRECTED) CONGENITAL MALFORMATIONS OF DIGESTIVE SYSTEM: Chronic | ICD-10-CM

## 2021-03-19 PROCEDURE — 88305 TISSUE EXAM BY PATHOLOGIST: CPT | Mod: 26

## 2021-03-19 PROCEDURE — 45172 EXC RECT TUM TRANSANAL FULL: CPT

## 2021-03-19 RX ORDER — HYDROMORPHONE HYDROCHLORIDE 2 MG/ML
0.5 INJECTION INTRAMUSCULAR; INTRAVENOUS; SUBCUTANEOUS
Refills: 0 | Status: DISCONTINUED | OUTPATIENT
Start: 2021-03-19 | End: 2021-03-19

## 2021-03-19 RX ORDER — SODIUM CHLORIDE 9 MG/ML
1000 INJECTION, SOLUTION INTRAVENOUS
Refills: 0 | Status: DISCONTINUED | OUTPATIENT
Start: 2021-03-19 | End: 2021-03-19

## 2021-03-19 RX ORDER — BUPIVACAINE 13.3 MG/ML
20 INJECTION, SUSPENSION, LIPOSOMAL INFILTRATION ONCE
Refills: 0 | Status: DISCONTINUED | OUTPATIENT
Start: 2021-03-19 | End: 2021-04-02

## 2021-03-19 RX ORDER — HYDROMORPHONE HYDROCHLORIDE 2 MG/ML
1 INJECTION INTRAMUSCULAR; INTRAVENOUS; SUBCUTANEOUS
Refills: 0 | Status: DISCONTINUED | OUTPATIENT
Start: 2021-03-19 | End: 2021-03-19

## 2021-03-19 RX ADMIN — HYDROMORPHONE HYDROCHLORIDE 1 MILLIGRAM(S): 2 INJECTION INTRAMUSCULAR; INTRAVENOUS; SUBCUTANEOUS at 12:18

## 2021-03-19 RX ADMIN — HYDROMORPHONE HYDROCHLORIDE 1 MILLIGRAM(S): 2 INJECTION INTRAMUSCULAR; INTRAVENOUS; SUBCUTANEOUS at 12:02

## 2021-03-19 NOTE — ASU DISCHARGE PLAN (ADULT/PEDIATRIC) - ASU DC SPECIAL INSTRUCTIONSFT
PAIN: Take Tylenol 650mg and/or Motrin 600mg every 6-8 hours as needed for mild to moderate pain. Take Percocet every 6-8 hours as needed for breakthrough, severe pain. This medication can cause you to become constipated, so you should take a stool softener while taking this medication (senna, miralax, etc.).  DRESSING: You have gel foam in your rectum. This will be excreted during your first bowel movement. Leave it in place until then. You may remove the outside dressing before your first bowel movement. You may perform sitz baths as needed.   FOLLOW UP: With Dr. Cheney in 3 weeks. Call to schedule an appointment.

## 2021-03-19 NOTE — CHART NOTE - NSCHARTNOTEFT_GEN_A_CORE
PACU ANESTHESIA ADMISSION NOTE      Procedure: Transanal resection of rectal tumor  Post op diagnosis:  Rectal tumor    ____  Intubated  TV:______       Rate: ______      FiO2: ______    __x__  Patent Airway    __x__  Full return of protective reflexes  x__  Full recovery from anesthesia / back to baseline     Vitals: VSS      Mental Status:  ____x Awake   _____ Alert   _____ Drowsy   _____ Sedated    Nausea/Vomiting:  ___x_ NO  ______Yes,   See Post - Op Orders          Pain Scale (0-10):  ____0_    Treatment: __x__ None    ____ See Post - Op/PCA Orders    Post - Operative Fluids:   ____ Oral   __x__ See Post - Op Orders  Diposition  x__Home       _____Floor     _____Critical Care    _____  Other:_________________    Comments:

## 2021-03-19 NOTE — ASU DISCHARGE PLAN (ADULT/PEDIATRIC) - CARE PROVIDER_API CALL
Patel Cheney)  ColonRectal Surgery  [address] [place] [address], [address] [place] [address]  Phone: [phone]  Fax: [phone]  Follow Up Time: 1 month

## 2021-03-29 LAB — SURGICAL PATHOLOGY STUDY: SIGNIFICANT CHANGE UP

## 2021-03-30 DIAGNOSIS — K62.1 RECTAL POLYP: ICD-10-CM

## 2021-03-30 DIAGNOSIS — K62.6 ULCER OF ANUS AND RECTUM: ICD-10-CM

## 2021-03-30 DIAGNOSIS — F17.210 NICOTINE DEPENDENCE, CIGARETTES, UNCOMPLICATED: ICD-10-CM

## 2021-04-06 ENCOUNTER — APPOINTMENT (OUTPATIENT)
Dept: SURGERY | Facility: CLINIC | Age: 31
End: 2021-04-06
Payer: SUBSIDIZED

## 2021-04-06 VITALS
WEIGHT: 213 LBS | SYSTOLIC BLOOD PRESSURE: 122 MMHG | BODY MASS INDEX: 33.43 KG/M2 | TEMPERATURE: 97.6 F | HEART RATE: 77 BPM | DIASTOLIC BLOOD PRESSURE: 80 MMHG | HEIGHT: 67 IN

## 2021-04-06 DIAGNOSIS — D49.0 NEOPLASM OF UNSPECIFIED BEHAVIOR OF DIGESTIVE SYSTEM: ICD-10-CM

## 2021-04-06 PROCEDURE — 99024 POSTOP FOLLOW-UP VISIT: CPT

## 2021-04-06 NOTE — PHYSICAL EXAM
[Alert] : alert [Oriented to Person] : oriented to person [Oriented to Place] : oriented to place [Oriented to Time] : oriented to time [Calm] : calm [de-identified] : Healthy male, NAD [de-identified] : Full range of motion [de-identified] : No focal deficits.

## 2021-04-06 NOTE — ASSESSMENT
[FreeTextEntry1] : Mr. YUOSIF looks and feels well. He has recovered nicely from surgery. His pathology was reviewed with him in detail. His pulse rolled inflammatory with no cancer or dysplasia. A copy of the pathology was given to him. We did have a brief discussion of the possibility for underlying R. rectal prolapse as his pathology suggested a solitary rectal ulcer syndrome. For now he is to does recover from his surgery and at next visit we'll likely do a anoscopy.

## 2021-04-06 NOTE — HISTORY OF PRESENT ILLNESS
[FreeTextEntry1] : This is a postop visit for Mr. YOUSIF who is approximately 3 weeks status post transanal excision of multiple large rectal masses. Mr. HALL looks and feels well. He has no complaints. He denies rectal pain, bleeding or drainage. He tolerated diet without difficulty and moving his bowels without difficulty.

## 2021-04-27 ENCOUNTER — APPOINTMENT (OUTPATIENT)
Dept: SURGERY | Facility: CLINIC | Age: 31
End: 2021-04-27

## 2022-03-28 ENCOUNTER — APPOINTMENT (OUTPATIENT)
Dept: SURGERY | Facility: CLINIC | Age: 32
End: 2022-03-28
Payer: COMMERCIAL

## 2022-03-28 VITALS
HEART RATE: 80 BPM | TEMPERATURE: 97.3 F | SYSTOLIC BLOOD PRESSURE: 120 MMHG | OXYGEN SATURATION: 96 % | DIASTOLIC BLOOD PRESSURE: 80 MMHG | WEIGHT: 225 LBS | BODY MASS INDEX: 35.31 KG/M2 | HEIGHT: 67 IN

## 2022-03-28 DIAGNOSIS — K62.6 ULCER OF ANUS AND RECTUM: ICD-10-CM

## 2022-03-28 DIAGNOSIS — K59.00 CONSTIPATION, UNSPECIFIED: ICD-10-CM

## 2022-03-28 PROCEDURE — 99072 ADDL SUPL MATRL&STAF TM PHE: CPT

## 2022-03-28 PROCEDURE — 99214 OFFICE O/P EST MOD 30 MIN: CPT

## 2022-03-28 RX ORDER — DOCUSATE SODIUM 100 MG/1
100 CAPSULE ORAL 3 TIMES DAILY
Qty: 90 | Refills: 4 | Status: ACTIVE | COMMUNITY
Start: 2022-03-28 | End: 1900-01-01

## 2022-03-28 RX ORDER — POLYETHYLENE GLYCOL 3350 17 G/17G
17 POWDER, FOR SOLUTION ORAL DAILY
Qty: 1 | Refills: 1 | Status: ACTIVE | COMMUNITY
Start: 2022-03-28 | End: 1900-01-01

## 2022-03-29 PROBLEM — K62.6 SOLITARY RECTAL ULCER SYNDROME: Status: ACTIVE | Noted: 2022-03-29

## 2022-03-29 NOTE — ASSESSMENT
[FreeTextEntry1] : 32M with SRUS s/p transanal excision\par \par I had a long conversation with the patient.  We discussed a high fiber diet, fiber supplement and osmotic laxatives to prevent straining.  We will obtain an MR defecography to evaluate for rectal intussusception and sitzmark study to evaluate for slow transit constipation. We will see him back after his images.

## 2022-03-29 NOTE — HISTORY OF PRESENT ILLNESS
[FreeTextEntry1] : 32 year old M with PMH of solitary rectal ulcer syndrome, presents today for constipation and bloody stools.\par Last colonoscopy with MD Arteaga 2/5/2021: results rectal ulcer and biopsy results of rectal ulcer syndrome. \par Pt subsequently  underwent a transanal excision with MD Cheney on 3/19/2021.\par \par Pt reports having 1 thin Bm daily with occasional use of laxatives. Pt reports occasional blood in his stool for 3-4 months. Pt denies fever, chills, nausea, vomiting, abdominal pain,, diarrhea,  or unexpected weight loss.\par \par Pt denies a family history of colon cancer, rectal cancer, or inflammatory bowel disease.\par

## 2022-03-29 NOTE — PHYSICAL EXAM
[Abdomen Masses] : No abdominal masses [Abdomen Tenderness] : ~T No ~M abdominal tenderness [No HSM] : no hepatosplenomegaly [Respiratory Effort] : normal respiratory effort [Normal Rate and Rhythm] : normal rate and rhythm [de-identified] : awake, alert and in no acute distress

## 2022-04-07 ENCOUNTER — NON-APPOINTMENT (OUTPATIENT)
Age: 32
End: 2022-04-07

## 2023-02-09 NOTE — PROGRESS NOTE ADULT - PROVIDER SPECIALTY LIST ADULT
Agree with increasing steroids currently .  If no worsening cerebral edema can always scale back in setting of immune therapy.  Agree with reporting MRI also  
I would increase his steroid to 4 mg BID.  If he isn't feeling better on this increased dose then we can move up his MRI.  
Phone call placed to Chalo and Michelle regarding responses/recommendations from both Dr. Rodriguez and Dr. Galicia. They both agree patient should increase his steroid to 4mg BID and move his brain MRI up. Writer informed them of recommendations and they were in agreement. They stated he does not need any additional dexamethasone, he has enough on hand.     MRI appointment changed to 2/10 with a 1915 arrival time.   
Received from Michelle, Chalo's wife.     \"We had a little scare last night with Chalo's blood pressure.  We have an automatic arm cuff so I dont know how accurate they are.  Last night BP was 180/118 he did have a quick seizure activity yesterday lasting around 5 minutes.  He was very tired following and slept until this morning.  I took multiple readings with his right arm and they were all high.  I then took b/p using left arm and it was much lower 139/87.  Is this something Chalo should get checked?  He does have a slight headache along with other symptoms of increased swelling on the brain.\"        Phone call placed to Michelle and Chalo to discuss. We discussed the message above, stating Chalo's normal BP is around 135/80-range. They state they purchased a new automatic BP cuff yesterday and the right arm consistently has a higher reading than the left. This morning his BP readings were:   Right arm: 166/109  Left arm: 139/87     They did call the after hours line last night and they recommended they go to the ER, but Chalo was so tired and wanted to sleep/\"wait it out\" that he slept the entire night/afternoon. He states usually he has only been getting 3 hour stretches of sleep, but yesterday after his seizure he slept all afternoon and all night, aside from eating dinner. Chalo states he knew he wasn't feeling \"good/didn't feel right\" at his appointment on Tuesday with Dr. Rodriguez or a couple of days prior to that. So he doesn't believe it is related to his steroid.       Treatment history:  1/26/23. C1D1 Carbo/Pem/Pem  1/5/23: SRS treatment     Next appointments:   2/16- C2 Carbo/pem/pem  2/17-brain MRI  2/23- appt with Dr Frida Galicia/Dr. Rodriguez please advise if any further testing/appointments are recommended-regarding yesterday's seizure activity and elevated blood pressures and if you would like his brain MRI completed sooner than 2/17. Thank you!!        
Gastroenterology
Hospitalist
Surgery
Hospitalist
Hospitalist

## 2024-05-06 NOTE — ED ADULT NURSE NOTE - PRO INTERPRETER NEED 2
88y Cantonese speaking Male with PMHx of JUSTYNA, stroke (2004, residual right sided weakness), HLD, glaucoma, DM, BPH, depression, b/l hearing loss [facility] left side arm and leg weakness starting 4/26 pm. Wife also noted truncal ataxia (slumping over to the left when sitting) and gait ataxia. CT imaging with no acute pathology. Transferred to [facility] for further stroke w/u. Course c/b periods of waxing/waning left sided hemiparesis likely due to hypoperfusion, s/p IVF boluses, now started on midodrine. Patient with urinary retention with postraumatic hematuria after straight cath in pt with BPH. Pending PEG tube on 5/7.    Neuro   #hx of stroke 2004 with residual right sided weakness    #CVA workup   Patient previously on aspirin, but was stopped by doctor in 2020, unclear reason why.    - continue ASA 81mg daily  - hold Plavix 75mg daily for the next 5 days for PEG placement   - hold Lovenox after AM dose on 5/6 for PEG placement  - continue atorvastatin 80mg daily    - q4hr stroke neuro checks and vitals   - MRI brain on 4/28 with R internal capsule infarct   - Stroke Code HCT Results: negative for acute ischemia    - Stroke Code CTA Results: stenosis of proximal bilateral A2 segments and R M2   - Stroke education   - follow repeat CT, place EEG    Cards   #HTN   - Goal -160, okay to sit up in bed with PT  - Now on 75cc/hr normal saline to keep BP> 120 mmhg  - Continue Midodrine 2.5mg BID on 4/30 as left LE weakness is waxing/waning despite stable BPs --> midodrine increased to 5mg BID on 5/1  - wife refusing WALLY/ILR, however she agreeable for [facility]OT, but after talking to EP team, she agreed to follow up with them after going to [facility] to consider monitoring.   - Cardiac CT to rule out SANDRO thrombus: No SANDRO or left atrial thrombus. Non cardiac: Apparent filling defects in segmental branches of the right lower lobe pulmonary artery.  - TTE: EF 56%     #HLD   - high dose statin as above in CVA   - LDL results: 170     Pulm   - call provider if SPO2 < 94%   - CT Angio Chest PE Protocol: No pulmonary embolism. No filling defect in the right lower lobe as seen on prior coronary CTA, likely motion artifact.    GI   #Nutrition/Fluids/Electrolytes    - replete K<4 and Mg <2   - Diet: continue tube feeds  - IVF: 100cc/hr    #dysphagia    - failed SLP evaluation   - FEES completed on 5/2 - recommended for PEG  - Wife agreeable to PEG. GI recommending Plavix being held for 5 days and Lovenox after AM dose on 5/6 and PEG tube placement on Tuesday.     #constipation   - c/w bowel regimen w/ senna+miralax     Renal  #BPH  #Urinary retention with postraumatic hematuria after straight cath  On 5/4, 900 cc and 350cc in BS with postraumatic hematuria  - Started on doxazosin 1mg/night (tamsulosin cannot be administered through PEG tube)   - Urology consulted, and hudson was changed to a smaller size     Endocrine   #DM   home meds: metformin 500mg TID, Januvia 50mg daily      - A1C results: 6.3%   - ISS     - TSH results: 2.080     Hematology   #JUSTYNA   - c/w home ferrous sulfate 325mg daily   - Ferritin: 410, total iron: 83, TIBC: 255, % saturation: 33     MSK   # leg cramps   - LE dopplers obtained, negative for DVT bilaterally      Psych   #depression   Per wife, prior to COVID, patient very active with friends/community, played sports. Since pandemic and worsening eyesight due to glaucoma and hearing, patient with no interest in daily activities. Was started on escitalopram 10mg daily, but stopped taking due to constipation   - consider restarting based on patient's mood and improvement of bowel movement after starting bowel regimen       DVT Prophylaxis   - lovenox sq for DVT prophylaxis    - SCDs for DVT prophylaxis      IDR Goals: Goals reviewed at interdisciplinary rounds with case management, social work, physical therapy, occupational therapy, and speech language pathology.    Please see specific therapy notes for in depth goals.     Dispo: AR    Discussed daily hospital plans and goals with patient and wife at bedside via .     Discussed with Dr. Payton who will discuss with Dr. Griggs    English

## 2025-01-29 ENCOUNTER — EMERGENCY (EMERGENCY)
Facility: HOSPITAL | Age: 35
LOS: 0 days | Discharge: HOME | End: 2025-01-29
Attending: EMERGENCY MEDICINE
Payer: MEDICAID

## 2025-01-29 VITALS
DIASTOLIC BLOOD PRESSURE: 79 MMHG | SYSTOLIC BLOOD PRESSURE: 145 MMHG | OXYGEN SATURATION: 99 % | RESPIRATION RATE: 18 BRPM | HEART RATE: 77 BPM | TEMPERATURE: 98 F | WEIGHT: 190.04 LBS

## 2025-01-29 DIAGNOSIS — Z87.738 PERSONAL HISTORY OF OTHER SPECIFIED (CORRECTED) CONGENITAL MALFORMATIONS OF DIGESTIVE SYSTEM: Chronic | ICD-10-CM

## 2025-01-29 DIAGNOSIS — M54.2 CERVICALGIA: ICD-10-CM

## 2025-01-29 DIAGNOSIS — M79.605 PAIN IN LEFT LEG: ICD-10-CM
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
## 2025-01-29 DIAGNOSIS — S80.12XA CONTUSION OF LEFT LOWER LEG, INITIAL ENCOUNTER: ICD-10-CM

## 2025-01-29 DIAGNOSIS — M79.602 PAIN IN LEFT ARM: ICD-10-CM

## 2025-01-29 DIAGNOSIS — M54.50 LOW BACK PAIN, UNSPECIFIED: ICD-10-CM

## 2025-01-29 DIAGNOSIS — Z98.890 OTHER SPECIFIED POSTPROCEDURAL STATES: Chronic | ICD-10-CM

## 2025-01-29 PROCEDURE — 71250 CT THORAX DX C-: CPT | Mod: MC

## 2025-01-29 PROCEDURE — 99285 EMERGENCY DEPT VISIT HI MDM: CPT

## 2025-01-29 PROCEDURE — 73080 X-RAY EXAM OF ELBOW: CPT | Mod: 26,LT

## 2025-01-29 PROCEDURE — 73110 X-RAY EXAM OF WRIST: CPT | Mod: 26,LT

## 2025-01-29 PROCEDURE — 72170 X-RAY EXAM OF PELVIS: CPT

## 2025-01-29 PROCEDURE — 73090 X-RAY EXAM OF FOREARM: CPT | Mod: LT

## 2025-01-29 PROCEDURE — 71046 X-RAY EXAM CHEST 2 VIEWS: CPT

## 2025-01-29 PROCEDURE — 73590 X-RAY EXAM OF LOWER LEG: CPT | Mod: LT

## 2025-01-29 PROCEDURE — 71250 CT THORAX DX C-: CPT | Mod: 26

## 2025-01-29 PROCEDURE — 74176 CT ABD & PELVIS W/O CONTRAST: CPT | Mod: 26

## 2025-01-29 PROCEDURE — 73090 X-RAY EXAM OF FOREARM: CPT | Mod: 26,LT

## 2025-01-29 PROCEDURE — 74176 CT ABD & PELVIS W/O CONTRAST: CPT | Mod: MC

## 2025-01-29 PROCEDURE — 73110 X-RAY EXAM OF WRIST: CPT | Mod: LT

## 2025-01-29 PROCEDURE — 72125 CT NECK SPINE W/O DYE: CPT | Mod: MC

## 2025-01-29 PROCEDURE — 99284 EMERGENCY DEPT VISIT MOD MDM: CPT | Mod: 25

## 2025-01-29 PROCEDURE — 72125 CT NECK SPINE W/O DYE: CPT | Mod: 26

## 2025-01-29 PROCEDURE — 73080 X-RAY EXAM OF ELBOW: CPT | Mod: LT

## 2025-01-29 PROCEDURE — 72170 X-RAY EXAM OF PELVIS: CPT | Mod: 26

## 2025-01-29 PROCEDURE — 73552 X-RAY EXAM OF FEMUR 2/>: CPT | Mod: LT

## 2025-01-29 PROCEDURE — 73590 X-RAY EXAM OF LOWER LEG: CPT | Mod: 26,LT

## 2025-01-29 PROCEDURE — 73552 X-RAY EXAM OF FEMUR 2/>: CPT | Mod: 26,LT

## 2025-01-29 PROCEDURE — 71046 X-RAY EXAM CHEST 2 VIEWS: CPT | Mod: 26

## 2025-01-29 NOTE — ED ADULT NURSE NOTE - NSFALLRISKINTERV_ED_ALL_ED

## 2025-01-29 NOTE — ED PROVIDER NOTE - MUSCULOSKELETAL, MLM
tenderness over C and L spine, left sided, Tenderness left arm from elbow to wrist, and left thgh and shin, bruising to left lower leg,

## 2025-01-29 NOTE — ED PROVIDER NOTE - IV ALTEPLASE EXCL ABS HIDDEN
show Nsaids Pregnancy And Lactation Text: These medications are considered safe up to 30 weeks gestation. It is excreted in breast milk.

## 2025-01-29 NOTE — ED PROVIDER NOTE - DISCHARGE DATE
[FreeTextEntry3] : Procedure- removal of cerumen bilaterally\par Diagnosis - cerumen impaction\par bilateral ears found to have impacted cerumen - they were cleared with suction and curette, canals appeared normal.\par 
29-Jan-2025

## 2025-01-29 NOTE — ED PROVIDER NOTE - PATIENT PORTAL LINK FT
You can access the FollowMyHealth Patient Portal offered by [facility] by registering at the following website: [email or web address]. By joining [employer]’s FollowMyHealth portal, you will also be able to view your health information using other applications (apps) compatible with our system.

## 2025-01-29 NOTE — ED PROVIDER NOTE - CARE PLAN
Principal Discharge DX:	Fall  Secondary Diagnosis:	Back pain  Secondary Diagnosis:	Neck pain  Secondary Diagnosis:	Left arm pain   1

## 2025-01-29 NOTE — ED PROVIDER NOTE - NSFOLLOWUPINSTRUCTIONS_ED_ALL_ED_FT
rest, Ice to injuries, motrin for pain, See orthopedic MD for follow up, See your PMD for further eval of prominent mesenteric lymph nodes rest, Ice to injuries, motrin for pain, See orthopedic MD for follow up, See your PMD for further eval of prominent mesenteric lymph nodes    Our Emergency Department Referral Coordinators will be reaching out to you in the next 24-48 hours from 9:00am to 5:00pm to schedule a follow up appointment. Please expect a phone call from the hospital in that time frame. If you do not receive a call or if you have any questions or concerns, you can reach them at   [phone].

## 2025-01-29 NOTE — ED PROVIDER NOTE - NSPTACCESSSVCSAPPTDETAILS_ED_ALL_ED_FT
needs follow up this week for workup of prominent mesenteric lymph nodes  and orthopedic MD for neck and back pain from fall

## 2025-01-29 NOTE — ED PROVIDER NOTE - CARE PROVIDER_API CALL
Diego Bhandari  Orthopaedic Surgery  [address] [place] [address]  Phone: [phone]  Fax: [phone]  Follow Up Time:

## 2025-01-29 NOTE — ED PROVIDER NOTE - CLINICAL SUMMARY MEDICAL DECISION MAKING FREE TEXT BOX
Patient with left-sided pain status post mechanical slip and fall on steps no head strike no LOC exam as above, imaging appreciated, will discharge supportive care and outpatient follow-up, counseled regarding conditions which should prompt return.

## 2025-01-29 NOTE — ED PROVIDER NOTE - OBJECTIVE STATEMENT
Patient c/o slip and fall on steps today, denies head inj, C/o  neck ., low back pain, Left arm and leg pain,

## 2025-01-30 NOTE — CHART NOTE - NSCHARTNOTEFT_GEN_A_CORE
would like soonest available, has open availability, sent to [facility] chat 1/30 - ANNETTE / appt scheduled on 1/31 @ 1:30pm @ [address] La 1/30 - ANNETTE (PCP Referral) would like soonest available, has open availability, sent to [facility] chat 1/30 - DK / appt scheduled on 1/31 @ 1:30pm @ [address] La, pt aware 1/30 - DK (PCP Referral)    appt scheduled on 4/1 @ 9am @[address], pt aware 1/30 - DK (Neurology Referral)

## 2025-01-31 ENCOUNTER — OUTPATIENT (OUTPATIENT)
Dept: OUTPATIENT SERVICES | Facility: HOSPITAL | Age: 35
LOS: 1 days | End: 2025-01-31
Payer: MEDICAID

## 2025-01-31 ENCOUNTER — APPOINTMENT (OUTPATIENT)
Dept: INTERNAL MEDICINE | Facility: CLINIC | Age: 35
End: 2025-01-31

## 2025-01-31 VITALS
DIASTOLIC BLOOD PRESSURE: 85 MMHG | BODY MASS INDEX: 38.61 KG/M2 | OXYGEN SATURATION: 97 % | HEIGHT: 67 IN | SYSTOLIC BLOOD PRESSURE: 125 MMHG | TEMPERATURE: 98.2 F | WEIGHT: 246 LBS | HEART RATE: 63 BPM

## 2025-01-31 DIAGNOSIS — R10.13 EPIGASTRIC PAIN: ICD-10-CM

## 2025-01-31 DIAGNOSIS — Z00.00 ENCOUNTER FOR GENERAL ADULT MEDICAL EXAMINATION WITHOUT ABNORMAL FINDINGS: ICD-10-CM

## 2025-01-31 DIAGNOSIS — R16.1 SPLENOMEGALY, NOT ELSEWHERE CLASSIFIED: ICD-10-CM

## 2025-01-31 DIAGNOSIS — K59.00 CONSTIPATION, UNSPECIFIED: ICD-10-CM

## 2025-01-31 DIAGNOSIS — K62.6 ULCER OF ANUS AND RECTUM: ICD-10-CM

## 2025-01-31 DIAGNOSIS — Z98.890 OTHER SPECIFIED POSTPROCEDURAL STATES: Chronic | ICD-10-CM

## 2025-01-31 DIAGNOSIS — Z87.738 PERSONAL HISTORY OF OTHER SPECIFIED (CORRECTED) CONGENITAL MALFORMATIONS OF DIGESTIVE SYSTEM: Chronic | ICD-10-CM

## 2025-01-31 DIAGNOSIS — Z87.19 PERSONAL HISTORY OF OTHER DISEASES OF THE DIGESTIVE SYSTEM: ICD-10-CM

## 2025-01-31 DIAGNOSIS — R59.0 LOCALIZED ENLARGED LYMPH NODES: ICD-10-CM

## 2025-01-31 DIAGNOSIS — R25.2 CRAMP AND SPASM: ICD-10-CM

## 2025-01-31 PROCEDURE — 99385 PREV VISIT NEW AGE 18-39: CPT

## 2025-01-31 RX ORDER — LORATADINE 5 MG
17 TABLET,CHEWABLE ORAL
Qty: 1 | Refills: 2 | Status: ACTIVE | COMMUNITY
Start: 2025-01-31 | End: 1900-01-01

## 2025-01-31 RX ORDER — PANTOPRAZOLE 40 MG/1
40 TABLET, DELAYED RELEASE ORAL
Qty: 30 | Refills: 2 | Status: ACTIVE | COMMUNITY
Start: 2025-01-31 | End: 1900-01-01

## 2025-02-03 ENCOUNTER — OUTPATIENT (OUTPATIENT)
Dept: OUTPATIENT SERVICES | Facility: HOSPITAL | Age: 35
LOS: 1 days | End: 2025-02-03
Payer: MEDICAID

## 2025-02-03 DIAGNOSIS — Z98.890 OTHER SPECIFIED POSTPROCEDURAL STATES: Chronic | ICD-10-CM

## 2025-02-03 DIAGNOSIS — Z87.738 PERSONAL HISTORY OF OTHER SPECIFIED (CORRECTED) CONGENITAL MALFORMATIONS OF DIGESTIVE SYSTEM: Chronic | ICD-10-CM

## 2025-02-03 DIAGNOSIS — K62.6 ULCER OF ANUS AND RECTUM: ICD-10-CM

## 2025-02-03 DIAGNOSIS — R59.0 LOCALIZED ENLARGED LYMPH NODES: ICD-10-CM

## 2025-02-03 PROCEDURE — 85025 COMPLETE CBC W/AUTO DIFF WBC: CPT

## 2025-02-03 PROCEDURE — 83625 ASSAY OF LDH ENZYMES: CPT

## 2025-02-03 PROCEDURE — 86780 TREPONEMA PALLIDUM: CPT

## 2025-02-03 PROCEDURE — 82728 ASSAY OF FERRITIN: CPT

## 2025-02-03 PROCEDURE — 83036 HEMOGLOBIN GLYCOSYLATED A1C: CPT

## 2025-02-03 PROCEDURE — 82306 VITAMIN D 25 HYDROXY: CPT

## 2025-02-03 PROCEDURE — 83735 ASSAY OF MAGNESIUM: CPT

## 2025-02-03 PROCEDURE — 82746 ASSAY OF FOLIC ACID SERUM: CPT

## 2025-02-03 PROCEDURE — 83615 LACTATE (LD) (LDH) ENZYME: CPT

## 2025-02-03 PROCEDURE — 86140 C-REACTIVE PROTEIN: CPT

## 2025-02-03 PROCEDURE — 86778 TOXOPLASMA ANTIBODY IGM: CPT

## 2025-02-03 PROCEDURE — 82232 ASSAY OF BETA-2 PROTEIN: CPT

## 2025-02-03 PROCEDURE — 84443 ASSAY THYROID STIM HORMONE: CPT

## 2025-02-03 PROCEDURE — 85652 RBC SED RATE AUTOMATED: CPT

## 2025-02-03 PROCEDURE — 80061 LIPID PANEL: CPT

## 2025-02-03 PROCEDURE — 86644 CMV ANTIBODY: CPT

## 2025-02-03 PROCEDURE — 86645 CMV ANTIBODY IGM: CPT

## 2025-02-03 PROCEDURE — 83010 ASSAY OF HAPTOGLOBIN QUANT: CPT

## 2025-02-03 PROCEDURE — 86777 TOXOPLASMA ANTIBODY: CPT

## 2025-02-03 PROCEDURE — 80053 COMPREHEN METABOLIC PANEL: CPT

## 2025-02-03 PROCEDURE — 82607 VITAMIN B-12: CPT

## 2025-02-03 PROCEDURE — 85730 THROMBOPLASTIN TIME PARTIAL: CPT

## 2025-02-03 PROCEDURE — 87799 DETECT AGENT NOS DNA QUANT: CPT

## 2025-02-03 PROCEDURE — 87389 HIV-1 AG W/HIV-1&-2 AB AG IA: CPT

## 2025-02-03 PROCEDURE — 85610 PROTHROMBIN TIME: CPT

## 2025-02-04 DIAGNOSIS — R59.0 LOCALIZED ENLARGED LYMPH NODES: ICD-10-CM

## 2025-02-04 LAB
25(OH)D3 SERPL-MCNC: 21 NG/ML
ALBUMIN SERPL ELPH-MCNC: 4.7 G/DL
ALP BLD-CCNC: 68 U/L
ALT SERPL-CCNC: 53 U/L
ANION GAP SERPL CALC-SCNC: 12 MMOL/L
APTT BLD: 30.1 SEC
AST SERPL-CCNC: 31 U/L
B2 MICROGLOB SERPL-MCNC: 1.7 MG/L
BASOPHILS # BLD AUTO: 0.02 K/UL
BASOPHILS NFR BLD AUTO: 0.4 %
BILIRUB SERPL-MCNC: 0.3 MG/DL
BUN SERPL-MCNC: 13 MG/DL
CALCIUM SERPL-MCNC: 9.4 MG/DL
CHLORIDE SERPL-SCNC: 104 MMOL/L
CHOLEST SERPL-MCNC: 216 MG/DL
CMV IGG SERPL QL: 4 U/ML
CMV IGG SERPL-IMP: POSITIVE
CMV IGM SERPL QL: <8 AU/ML
CMV IGM SERPL QL: NEGATIVE
CO2 SERPL-SCNC: 23 MMOL/L
CREAT SERPL-MCNC: 1.1 MG/DL
CRP SERPL-MCNC: <3 MG/L
EBV DNA SERPL NAA+PROBE-ACNC: NOT DETECTED IU/ML
EBVPCR LOG: NOT DETECTED LOG10IU/ML
EGFR: 91 ML/MIN/1.73M2
EOSINOPHIL # BLD AUTO: 0.07 K/UL
EOSINOPHIL NFR BLD AUTO: 1.3 %
ERYTHROCYTE [SEDIMENTATION RATE] IN BLOOD BY WESTERGREN METHOD: 5 MM/HR
ESTIMATED AVERAGE GLUCOSE: 94 MG/DL
FERRITIN SERPL-MCNC: 96 NG/ML
FOLATE SERPL-MCNC: 10.9 NG/ML
GLUCOSE SERPL-MCNC: 106 MG/DL
HAPTOGLOB SERPL-MCNC: 136 MG/DL
HBA1C MFR BLD HPLC: 4.9 %
HCT VFR BLD CALC: 44.8 %
HDLC SERPL-MCNC: 33 MG/DL
HGB BLD-MCNC: 15.3 G/DL
HIV1+2 AB SPEC QL IA.RAPID: NONREACTIVE
IMM GRANULOCYTES NFR BLD AUTO: 0.7 %
INR PPP: 0.87 RATIO
LDLC SERPL CALC-MCNC: 132 MG/DL
LYMPHOCYTES # BLD AUTO: 1.42 K/UL
LYMPHOCYTES NFR BLD AUTO: 26.6 %
MAGNESIUM SERPL-MCNC: 2 MG/DL
MAN DIFF?: NORMAL
MCHC RBC-ENTMCNC: 28 PG
MCHC RBC-ENTMCNC: 34.2 G/DL
MCV RBC AUTO: 82.1 FL
MONOCYTES # BLD AUTO: 0.46 K/UL
MONOCYTES NFR BLD AUTO: 8.6 %
NEUTROPHILS # BLD AUTO: 3.33 K/UL
NEUTROPHILS NFR BLD AUTO: 62.4 %
NONHDLC SERPL-MCNC: 183 MG/DL
PLATELET # BLD AUTO: 210 K/UL
PMV BLD AUTO: 0 /100 WBCS
POTASSIUM SERPL-SCNC: 4.3 MMOL/L
PROT SERPL-MCNC: 7.1 G/DL
PT BLD: 10.2 SEC
RBC # BLD: 5.46 M/UL
RBC # FLD: 12.3 %
SODIUM SERPL-SCNC: 139 MMOL/L
T GONDII AB SER-IMP: NEGATIVE
T GONDII AB SER-IMP: NEGATIVE
T GONDII IGG SER QL: <3 IU/ML
T GONDII IGM SER QL: <3 AU/ML
T PALLIDUM AB SER QL IA: NEGATIVE
TRIGL SERPL-MCNC: 282 MG/DL
TSH SERPL-ACNC: 3.04 UIU/ML
VIT B12 SERPL-MCNC: 988 PG/ML
WBC # FLD AUTO: 5.34 K/UL

## 2025-02-06 LAB
LDH SERPL-CCNC: 182 IU/L
LDH1 CFR SERPL ELPH: 25 %
LDH2 CFR SERPL ELPH: 33 %
LDH3 CFR SERPL ELPH: 20 %
LDH4 CFR SERPL ELPH: 9 %
LDH5 CFR SERPL ELPH: 13 %

## 2025-02-10 PROBLEM — Z87.19 HISTORY OF CHRONIC CONSTIPATION: Status: RESOLVED | Noted: 2025-01-31 | Resolved: 2025-02-10

## 2025-02-11 ENCOUNTER — APPOINTMENT (OUTPATIENT)
Age: 35
End: 2025-02-11
Payer: MEDICAID

## 2025-02-11 ENCOUNTER — OUTPATIENT (OUTPATIENT)
Dept: OUTPATIENT SERVICES | Facility: HOSPITAL | Age: 35
LOS: 1 days | End: 2025-02-11
Payer: MEDICAID

## 2025-02-11 ENCOUNTER — NON-APPOINTMENT (OUTPATIENT)
Age: 35
End: 2025-02-11

## 2025-02-11 VITALS
HEART RATE: 90 BPM | WEIGHT: 248 LBS | RESPIRATION RATE: 16 BRPM | DIASTOLIC BLOOD PRESSURE: 81 MMHG | BODY MASS INDEX: 35.91 KG/M2 | TEMPERATURE: 97.8 F | OXYGEN SATURATION: 99 % | HEIGHT: 69.5 IN | SYSTOLIC BLOOD PRESSURE: 134 MMHG

## 2025-02-11 DIAGNOSIS — Z98.890 OTHER SPECIFIED POSTPROCEDURAL STATES: Chronic | ICD-10-CM

## 2025-02-11 DIAGNOSIS — D49.0 NEOPLASM OF UNSPECIFIED BEHAVIOR OF DIGESTIVE SYSTEM: ICD-10-CM

## 2025-02-11 DIAGNOSIS — Z87.738 PERSONAL HISTORY OF OTHER SPECIFIED (CORRECTED) CONGENITAL MALFORMATIONS OF DIGESTIVE SYSTEM: Chronic | ICD-10-CM

## 2025-02-11 DIAGNOSIS — R16.1 SPLENOMEGALY, NOT ELSEWHERE CLASSIFIED: ICD-10-CM

## 2025-02-11 PROCEDURE — 83921 ORGANIC ACID SINGLE QUANT: CPT

## 2025-02-11 PROCEDURE — 85730 THROMBOPLASTIN TIME PARTIAL: CPT

## 2025-02-11 PROCEDURE — 85240 CLOT FACTOR VIII AHG 1 STAGE: CPT

## 2025-02-11 PROCEDURE — 80076 HEPATIC FUNCTION PANEL: CPT

## 2025-02-11 PROCEDURE — 99205 OFFICE O/P NEW HI 60 MIN: CPT

## 2025-02-11 PROCEDURE — 85246 CLOT FACTOR VIII VW ANTIGEN: CPT

## 2025-02-11 PROCEDURE — 85245 CLOT FACTOR VIII VW RISTOCTN: CPT

## 2025-02-11 PROCEDURE — 85610 PROTHROMBIN TIME: CPT

## 2025-02-11 PROCEDURE — 83550 IRON BINDING TEST: CPT

## 2025-02-11 PROCEDURE — 85247 CLOT FACTOR VIII MULTIMETRIC: CPT

## 2025-02-11 PROCEDURE — 82746 ASSAY OF FOLIC ACID SERUM: CPT

## 2025-02-11 PROCEDURE — 85025 COMPLETE CBC W/AUTO DIFF WBC: CPT

## 2025-02-11 PROCEDURE — 80048 BASIC METABOLIC PNL TOTAL CA: CPT

## 2025-02-11 PROCEDURE — 82728 ASSAY OF FERRITIN: CPT

## 2025-02-11 PROCEDURE — 83540 ASSAY OF IRON: CPT

## 2025-02-11 PROCEDURE — 82607 VITAMIN B-12: CPT

## 2025-02-12 DIAGNOSIS — R16.1 SPLENOMEGALY, NOT ELSEWHERE CLASSIFIED: ICD-10-CM

## 2025-02-12 LAB
ALBUMIN SERPL ELPH-MCNC: 4.8 G/DL
ALP BLD-CCNC: 72 U/L
ALT SERPL-CCNC: 32 U/L
ANION GAP SERPL CALC-SCNC: 13 MMOL/L
APTT BLD: 30.8 SEC
AST SERPL-CCNC: 22 U/L
AUTO BASOPHILS #: 0.03 K/UL
AUTO BASOPHILS %: 0.5 %
AUTO EOSINOPHILS #: 0.04 K/UL
AUTO EOSINOPHILS %: 0.6 %
AUTO IMMATURE GRANULOCYTES #: 0.04 K/UL
AUTO LYMPHOCYTES #: 1.32 K/UL
AUTO LYMPHOCYTES %: 20.7 %
AUTO MONOCYTES #: 0.49 K/UL
AUTO MONOCYTES %: 7.7 %
AUTO NEUTROPHILS #: 4.46 K/UL
AUTO NEUTROPHILS %: 69.9 %
AUTO NRBC #: 0 K/UL
BILIRUB DIRECT SERPL-MCNC: <0.2 MG/DL
BILIRUB INDIRECT SERPL-MCNC: >0.2 MG/DL
BILIRUB SERPL-MCNC: 0.4 MG/DL
BUN SERPL-MCNC: 17 MG/DL
CALCIUM SERPL-MCNC: 9.2 MG/DL
CHLORIDE SERPL-SCNC: 103 MMOL/L
CO2 SERPL-SCNC: 25 MMOL/L
CREAT SERPL-MCNC: 1 MG/DL
EGFR: 102 ML/MIN/1.73M2
FERRITIN SERPL-MCNC: 82 NG/ML
FOLATE SERPL-MCNC: 10.6 NG/ML
GLUCOSE SERPL-MCNC: 107 MG/DL
HCT VFR BLD CALC: 42.2 %
HGB BLD-MCNC: 15.2 G/DL
IMM GRANULOCYTES NFR BLD AUTO: 0.6 %
INR PPP: 0.92 RATIO
IRON SATN MFR SERPL: 41 %
IRON SERPL-MCNC: 126 UG/DL
MAN DIFF?: NORMAL
MCHC RBC-ENTMCNC: 29.1 PG
MCHC RBC-ENTMCNC: 36 G/DL
MCV RBC AUTO: 80.8 FL
PLATELET # BLD AUTO: 216 K/UL
PMV BLD AUTO: 0 /100 WBCS
PMV BLD: 9.7 FL
POTASSIUM SERPL-SCNC: 3.9 MMOL/L
PROT SERPL-MCNC: 7.2 G/DL
PT BLD: 10.8 SEC
RBC # BLD: 5.22 M/UL
RBC # FLD: 12.2 %
SODIUM SERPL-SCNC: 141 MMOL/L
TIBC SERPL-MCNC: 310 UG/DL
UIBC SERPL-MCNC: 184 UG/DL
VIT B12 SERPL-MCNC: 1081 PG/ML
VWF AG PPP IA-ACNC: 141 %
WBC # FLD AUTO: 6.38 K/UL

## 2025-02-13 DIAGNOSIS — R59.0 LOCALIZED ENLARGED LYMPH NODES: ICD-10-CM

## 2025-02-13 DIAGNOSIS — R16.1 SPLENOMEGALY, NOT ELSEWHERE CLASSIFIED: ICD-10-CM

## 2025-02-13 DIAGNOSIS — R10.13 EPIGASTRIC PAIN: ICD-10-CM

## 2025-02-13 LAB — FACT VIII ACT/NOR PPP: 78 %

## 2025-02-17 LAB — METHYLMALONATE SERPL-SCNC: 122 NMOL/L

## 2025-02-18 ENCOUNTER — OUTPATIENT (OUTPATIENT)
Dept: OUTPATIENT SERVICES | Facility: HOSPITAL | Age: 35
LOS: 1 days | End: 2025-02-18
Payer: MEDICAID

## 2025-02-18 ENCOUNTER — RESULT REVIEW (OUTPATIENT)
Age: 35
End: 2025-02-18

## 2025-02-18 DIAGNOSIS — Z98.890 OTHER SPECIFIED POSTPROCEDURAL STATES: Chronic | ICD-10-CM

## 2025-02-18 DIAGNOSIS — Z87.738 PERSONAL HISTORY OF OTHER SPECIFIED (CORRECTED) CONGENITAL MALFORMATIONS OF DIGESTIVE SYSTEM: Chronic | ICD-10-CM

## 2025-02-18 DIAGNOSIS — R59.0 LOCALIZED ENLARGED LYMPH NODES: ICD-10-CM

## 2025-02-18 LAB — GLUCOSE BLDC GLUCOMTR-MCNC: 115 MG/DL — HIGH (ref 70–99)

## 2025-02-18 PROCEDURE — A9552: CPT

## 2025-02-18 PROCEDURE — 78815 PET IMAGE W/CT SKULL-THIGH: CPT | Mod: PI

## 2025-02-18 PROCEDURE — 78815 PET IMAGE W/CT SKULL-THIGH: CPT | Mod: 26,PI

## 2025-02-18 PROCEDURE — 82962 GLUCOSE BLOOD TEST: CPT

## 2025-02-19 DIAGNOSIS — R59.0 LOCALIZED ENLARGED LYMPH NODES: ICD-10-CM

## 2025-02-20 ENCOUNTER — APPOINTMENT (OUTPATIENT)
Dept: GASTROENTEROLOGY | Facility: CLINIC | Age: 35
End: 2025-02-20
Payer: MEDICAID

## 2025-02-20 VITALS — HEIGHT: 67 IN | WEIGHT: 244.6 LBS | BODY MASS INDEX: 38.39 KG/M2

## 2025-02-20 DIAGNOSIS — Z78.9 OTHER SPECIFIED HEALTH STATUS: ICD-10-CM

## 2025-02-20 DIAGNOSIS — K62.5 HEMORRHAGE OF ANUS AND RECTUM: ICD-10-CM

## 2025-02-20 DIAGNOSIS — F17.200 NICOTINE DEPENDENCE, UNSPECIFIED, UNCOMPLICATED: ICD-10-CM

## 2025-02-20 DIAGNOSIS — R59.0 LOCALIZED ENLARGED LYMPH NODES: ICD-10-CM

## 2025-02-20 LAB — VWF MULTIMERS PPP IA-ACNC: NORMAL

## 2025-02-20 PROCEDURE — 99203 OFFICE O/P NEW LOW 30 MIN: CPT

## 2025-02-20 RX ORDER — POLYETHYLENE GLYCOL 3350 AND ELECTROLYTES WITH LEMON FLAVOR 236; 22.74; 6.74; 5.86; 2.97 G/4L; G/4L; G/4L; G/4L; G/4L
236 POWDER, FOR SOLUTION ORAL
Qty: 1 | Refills: 0 | Status: ACTIVE | COMMUNITY
Start: 2025-02-20 | End: 1900-01-01

## 2025-02-25 ENCOUNTER — APPOINTMENT (OUTPATIENT)
Dept: SURGERY | Facility: CLINIC | Age: 35
End: 2025-02-25

## 2025-03-03 ENCOUNTER — APPOINTMENT (OUTPATIENT)
Age: 35
End: 2025-03-03

## 2025-04-08 ENCOUNTER — APPOINTMENT (OUTPATIENT)
Dept: NEUROLOGY | Facility: CLINIC | Age: 35
End: 2025-04-08

## 2025-04-30 ENCOUNTER — APPOINTMENT (OUTPATIENT)
Dept: GASTROENTEROLOGY | Facility: CLINIC | Age: 35
End: 2025-04-30